# Patient Record
Sex: MALE | Race: WHITE | Employment: OTHER | URBAN - METROPOLITAN AREA
[De-identification: names, ages, dates, MRNs, and addresses within clinical notes are randomized per-mention and may not be internally consistent; named-entity substitution may affect disease eponyms.]

---

## 2018-03-27 NOTE — H&P
Date:   [facility]  Name: Sera Willard                 : 1950 Sex: M  Age: 78 yrs  Acct#:  [id number]               CC:  Discussion regarding port placement    HPI: [The wound involving the right medial thigh has improved. Again, the patient has metastatic melanoma. ]    Meds Prior to Visit:  Crestor     Aspirin Low Strength     Colace        Allergies:  NKDA        Wt Prior: 284lb as of 18    Exam:    Lungs are clear to auscultation  Cardiac regular rate and rhythm without murmurs rubs or gallops  Abdomen soft nondistended and nontender  Extremities with a closed wound. Assessment #1: Hx I87.2 Venous insufficiency (chronic) (peripheral)   Care Plan:              Comments       : The patient will undergo placement of a Port-A-Cath.     Assessment #2: Hx C43.71 Malignant melanoma of right lower limb, including hip   Care Plan:                    Seen by:

## 2018-03-30 ENCOUNTER — HOSPITAL ENCOUNTER (OUTPATIENT)
Age: 68
Setting detail: OUTPATIENT SURGERY
Discharge: HOME | End: 2018-03-30
Attending: SURGERY | Admitting: SURGERY
Payer: MEDICARE

## 2018-03-30 ENCOUNTER — ANESTHESIA (OUTPATIENT)
Dept: OPERATING ROOM | Age: 68
End: 2018-03-30
Payer: MEDICARE

## 2018-03-30 ENCOUNTER — APPOINTMENT (OUTPATIENT)
Dept: GENERAL RADIOLOGY | Age: 68
End: 2018-03-30
Attending: SURGERY
Payer: MEDICARE

## 2018-03-30 ENCOUNTER — ANESTHESIA EVENT (OUTPATIENT)
Dept: OPERATING ROOM | Age: 68
End: 2018-03-30
Payer: MEDICARE

## 2018-03-30 VITALS — SYSTOLIC BLOOD PRESSURE: 127 MMHG | DIASTOLIC BLOOD PRESSURE: 80 MMHG | OXYGEN SATURATION: 99 %

## 2018-03-30 VITALS
HEIGHT: 70 IN | OXYGEN SATURATION: 97 % | BODY MASS INDEX: 40.09 KG/M2 | DIASTOLIC BLOOD PRESSURE: 94 MMHG | HEART RATE: 87 BPM | WEIGHT: 280 LBS | SYSTOLIC BLOOD PRESSURE: 127 MMHG | TEMPERATURE: 97.8 F | RESPIRATION RATE: 18 BRPM

## 2018-03-30 DIAGNOSIS — C43.9 METASTATIC MELANOMA (HCC): Primary | ICD-10-CM

## 2018-03-30 PROCEDURE — 2580000003 HC RX 258: Performed by: SURGERY

## 2018-03-30 PROCEDURE — 7100000011 HC PHASE II RECOVERY - ADDTL 15 MIN: Performed by: SURGERY

## 2018-03-30 PROCEDURE — C1788 PORT, INDWELLING, IMP: HCPCS | Performed by: SURGERY

## 2018-03-30 PROCEDURE — 3700000001 HC ADD 15 MINUTES (ANESTHESIA): Performed by: SURGERY

## 2018-03-30 PROCEDURE — 3600000012 HC SURGERY LEVEL 2 ADDTL 15MIN: Performed by: SURGERY

## 2018-03-30 PROCEDURE — 3700000000 HC ANESTHESIA ATTENDED CARE: Performed by: SURGERY

## 2018-03-30 PROCEDURE — 2500000003 HC RX 250 WO HCPCS: Performed by: SURGERY

## 2018-03-30 PROCEDURE — 6360000002 HC RX W HCPCS: Performed by: SURGERY

## 2018-03-30 PROCEDURE — 6360000002 HC RX W HCPCS: Performed by: NURSE ANESTHETIST, CERTIFIED REGISTERED

## 2018-03-30 PROCEDURE — 76000 FLUOROSCOPY <1 HR PHYS/QHP: CPT

## 2018-03-30 PROCEDURE — 2580000003 HC RX 258: Performed by: NURSE ANESTHETIST, CERTIFIED REGISTERED

## 2018-03-30 PROCEDURE — 3600000002 HC SURGERY LEVEL 2 BASE: Performed by: SURGERY

## 2018-03-30 PROCEDURE — 71045 X-RAY EXAM CHEST 1 VIEW: CPT

## 2018-03-30 PROCEDURE — 7100000010 HC PHASE II RECOVERY - FIRST 15 MIN: Performed by: SURGERY

## 2018-03-30 DEVICE — PORT SMARTPORT 8FR CT TI W/VLV SHTH: Type: IMPLANTABLE DEVICE | Site: CHEST | Status: FUNCTIONAL

## 2018-03-30 RX ORDER — HYDROCODONE BITARTRATE AND ACETAMINOPHEN 5; 325 MG/1; MG/1
1 TABLET ORAL PRN
Status: DISCONTINUED | OUTPATIENT
Start: 2018-03-30 | End: 2018-03-30 | Stop reason: HOSPADM

## 2018-03-30 RX ORDER — FENTANYL CITRATE 50 UG/ML
INJECTION, SOLUTION INTRAMUSCULAR; INTRAVENOUS PRN
Status: DISCONTINUED | OUTPATIENT
Start: 2018-03-30 | End: 2018-03-30 | Stop reason: SDUPTHER

## 2018-03-30 RX ORDER — LIDOCAINE HYDROCHLORIDE 10 MG/ML
INJECTION, SOLUTION INFILTRATION; PERINEURAL PRN
Status: DISCONTINUED | OUTPATIENT
Start: 2018-03-30 | End: 2018-03-30 | Stop reason: HOSPADM

## 2018-03-30 RX ORDER — PROPOFOL 10 MG/ML
INJECTION, EMULSION INTRAVENOUS CONTINUOUS PRN
Status: DISCONTINUED | OUTPATIENT
Start: 2018-03-30 | End: 2018-03-30 | Stop reason: SDUPTHER

## 2018-03-30 RX ORDER — SODIUM CHLORIDE 9 MG/ML
INJECTION, SOLUTION INTRAVENOUS CONTINUOUS PRN
Status: DISCONTINUED | OUTPATIENT
Start: 2018-03-30 | End: 2018-03-30 | Stop reason: SDUPTHER

## 2018-03-30 RX ORDER — SODIUM CHLORIDE 0.9 % (FLUSH) 0.9 %
10 SYRINGE (ML) INJECTION PRN
Status: DISCONTINUED | OUTPATIENT
Start: 2018-03-30 | End: 2018-03-30 | Stop reason: HOSPADM

## 2018-03-30 RX ORDER — SODIUM CHLORIDE 0.9 % (FLUSH) 0.9 %
10 SYRINGE (ML) INJECTION EVERY 12 HOURS SCHEDULED
Status: DISCONTINUED | OUTPATIENT
Start: 2018-03-30 | End: 2018-03-30 | Stop reason: HOSPADM

## 2018-03-30 RX ORDER — ONDANSETRON 2 MG/ML
INJECTION INTRAMUSCULAR; INTRAVENOUS PRN
Status: DISCONTINUED | OUTPATIENT
Start: 2018-03-30 | End: 2018-03-30 | Stop reason: SDUPTHER

## 2018-03-30 RX ORDER — PROPOFOL 10 MG/ML
INJECTION, EMULSION INTRAVENOUS PRN
Status: DISCONTINUED | OUTPATIENT
Start: 2018-03-30 | End: 2018-03-30 | Stop reason: SDUPTHER

## 2018-03-30 RX ORDER — MIDAZOLAM HYDROCHLORIDE 1 MG/ML
INJECTION INTRAMUSCULAR; INTRAVENOUS PRN
Status: DISCONTINUED | OUTPATIENT
Start: 2018-03-30 | End: 2018-03-30 | Stop reason: SDUPTHER

## 2018-03-30 RX ORDER — HEPARIN SODIUM (PORCINE) LOCK FLUSH IV SOLN 100 UNIT/ML 100 UNIT/ML
SOLUTION INTRAVENOUS PRN
Status: DISCONTINUED | OUTPATIENT
Start: 2018-03-30 | End: 2018-03-30 | Stop reason: HOSPADM

## 2018-03-30 RX ORDER — OXYCODONE HYDROCHLORIDE AND ACETAMINOPHEN 5; 325 MG/1; MG/1
1 TABLET ORAL EVERY 6 HOURS PRN
Qty: 15 TABLET | Refills: 0 | Status: SHIPPED | OUTPATIENT
Start: 2018-03-30 | End: 2018-04-06

## 2018-03-30 RX ADMIN — PROPOFOL 120 MG: 10 INJECTION, EMULSION INTRAVENOUS at 07:46

## 2018-03-30 RX ADMIN — SODIUM CHLORIDE: 9 INJECTION, SOLUTION INTRAVENOUS at 07:44

## 2018-03-30 RX ADMIN — MIDAZOLAM HYDROCHLORIDE 2 MG: 1 INJECTION, SOLUTION INTRAMUSCULAR; INTRAVENOUS at 07:44

## 2018-03-30 RX ADMIN — FENTANYL CITRATE 100 MCG: 50 INJECTION, SOLUTION INTRAMUSCULAR; INTRAVENOUS at 07:46

## 2018-03-30 RX ADMIN — SODIUM CHLORIDE: 9 INJECTION, SOLUTION INTRAVENOUS at 08:20

## 2018-03-30 RX ADMIN — ONDANSETRON 4 MG: 2 INJECTION, SOLUTION INTRAMUSCULAR; INTRAVENOUS at 07:46

## 2018-03-30 RX ADMIN — PROPOFOL 70 MCG/KG/MIN: 10 INJECTION, EMULSION INTRAVENOUS at 07:46

## 2018-03-30 RX ADMIN — CEFAZOLIN SODIUM 3 G: 10 INJECTION, POWDER, FOR SOLUTION INTRAVENOUS at 08:04

## 2018-03-30 ASSESSMENT — PAIN DESCRIPTION - LOCATION
LOCATION: CHEST

## 2018-03-30 ASSESSMENT — PAIN SCALES - GENERAL
PAINLEVEL_OUTOF10: 0
PAINLEVEL_OUTOF10: 2
PAINLEVEL_OUTOF10: 0
PAINLEVEL_OUTOF10: 0

## 2018-03-30 ASSESSMENT — PAIN DESCRIPTION - FREQUENCY: FREQUENCY: CONTINUOUS

## 2018-03-30 ASSESSMENT — PAIN DESCRIPTION - PAIN TYPE
TYPE: SURGICAL PAIN

## 2018-03-30 ASSESSMENT — PULMONARY FUNCTION TESTS
PIF_VALUE: 0
PIF_VALUE: 1
PIF_VALUE: 0
PIF_VALUE: 1
PIF_VALUE: 1
PIF_VALUE: 0

## 2018-03-30 ASSESSMENT — PAIN DESCRIPTION - DESCRIPTORS: DESCRIPTORS: DISCOMFORT

## 2018-03-30 ASSESSMENT — PAIN DESCRIPTION - ONSET: ONSET: GRADUAL

## 2018-03-30 ASSESSMENT — PAIN - FUNCTIONAL ASSESSMENT: PAIN_FUNCTIONAL_ASSESSMENT: 0-10

## 2018-03-30 ASSESSMENT — LIFESTYLE VARIABLES: SMOKING_STATUS: 0

## 2018-03-30 ASSESSMENT — PAIN DESCRIPTION - ORIENTATION
ORIENTATION: LEFT;UPPER
ORIENTATION: LEFT

## 2018-03-30 NOTE — ANESTHESIA PRE PROCEDURE
Department of Anesthesiology  Preprocedure Note       Name:  Collin Hennessy   Age:  78 y.o.  :  1950                                          MRN:  [id number]         Date:  3/30/2018      Surgeon: Enrrique Aranda):  Raffaele Leonard MD    Procedure: Procedure(s):  POWER PORT INSERTION (THIS START TIME)    Medications prior to admission:   Prior to Admission medications    Medication Sig Start Date End Date Taking? Authorizing Provider   docusate sodium (COLACE) 100 MG capsule Take 100 mg by mouth daily    Yes Historical Provider, MD   aspirin 325 MG tablet Take 325 mg by mouth daily Prescribed per cardiology , stent; pt states instructed to hold 7 days preop per Dr Dario Villatoro. Yes Historical Provider, MD   rosuvastatin (CRESTOR) 40 MG tablet Take 40 mg by mouth every evening. Yes Historical Provider, MD       Current medications:    Current Facility-Administered Medications   Medication Dose Route Frequency Provider Last Rate Last Dose    sodium chloride flush 0.9 % injection 10 mL  10 mL Intravenous 2 times per day Raffaele Leonard MD        sodium chloride flush 0.9 % injection 10 mL  10 mL Intravenous PRN Raffaele Leonard MD        ceFAZolin (ANCEF) 2 g in sterile water 20 mL IV syringe  2 g Intravenous On Call to [place] [address] Anthony Busby MD        ceFAZolin (ANCEF) 2-3 GM-% IVPB (duplex) SOLR                Allergies:  No Known Allergies    Problem List:    Patient Active Problem List   Diagnosis Code    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 4XXA       Past Medical History:        Diagnosis Date    A-fib [facility])     CAD (coronary artery disease)     MI, STENTS. follows with Dr Xavier Van [facility]) 2014    CA COLON    Encounter for aspirin therapy     POST ANGIOPLASTY WITH STENTS    Hyperlipidemia     Melanoma of thigh, right ([facility] [address] 2018    Neuropathy ([facility] [address]     LEFT ULNAR NERVE/right also    Preoperative clearance 14    Cardiac 8 03/21/2015    CREATININE 0.9 03/21/2015    GFRAA >60 03/21/2015    LABGLOM >60 03/21/2015    GLUCOSE 128 03/21/2015    PROT 7.3 03/16/2015    CALCIUM 8.4 03/21/2015    BILITOT 0.5 03/16/2015    ALKPHOS 187 03/16/2015    AST 57 03/16/2015    ALT 52 03/16/2015       POC Tests: No results for input(s): POCGLU, POCNA, POCK, POCCL, POCBUN, POCHEMO, POCHCT in the last 72 hours. Coags:   Lab Results   Component Value Date    PROTIME 12.8 03/20/2015    INR 1.2 03/20/2015    APTT 36.0 03/20/2015       HCG (If Applicable): No results found for: PREGTESTUR, PREGSERUM, HCG, HCGQUANT     ABGs: No results found for: PHART, PO2ART, FJI5HPL, OWU5HRG, BEART, W2BICIRK     Type & Screen (If Applicable):  No results found for: LABABO, [address]    Anesthesia Evaluation  Patient summary reviewed and Nursing notes reviewed no history of anesthetic complications:   Airway: Mallampati: II  TM distance: >3 FB   Neck ROM: full  Mouth opening: > = 3 FB Dental: normal exam         Pulmonary:Negative Pulmonary ROS breath sounds clear to auscultation      (-) not a current smoker                           Cardiovascular:  Exercise tolerance: good (>4 METS),   (+) CAD:, CABG/stent:, dysrhythmias: atrial fibrillation, hyperlipidemia      ECG reviewed  Rhythm: regular  Rate: normal      Cleared by cardiology              Neuro/Psych:   (+) neuromuscular disease:,              ROS comment: neuropathy GI/Hepatic/Renal:             Endo/Other:    (+) malignancy/cancer. ROS comment: melanoma Abdominal:           Vascular: negative vascular ROS. Anesthesia Plan      MAC     ASA 3             Anesthetic plan and risks discussed with patient and spouse.                     Patrici Meckel, MD   3/30/2018

## 2018-03-30 NOTE — ANESTHESIA POSTPROCEDURE EVALUATION
Department of Anesthesiology  Postprocedure Note    Patient: Antonio Shoemaker  MRN: [id number]  YOB: 1950  Date of evaluation: 3/30/2018  Time:  5:39 PM     Procedure Summary     Date:  03/30/18 Room / Location:  [facility] OR 07 / [facility] OR    Anesthesia Start:  2849 Anesthesia Stop:  0827    Procedure:  POWER PORT INSERTION (THIS START TIME) (N/A Chest) Diagnosis:  [facility])    Surgeon:  Annalise Bo MD Responsible Provider:  Da Sweeney MD    Anesthesia Type:  MAC ASA Status:  3          Anesthesia Type: MAC    Abigail Phase I: Abigail Score: 10    Abigail Phase II: Abigail Score: 10    Last vitals: Reviewed and per EMR flowsheets.        Anesthesia Post Evaluation    Patient location during evaluation: PACU  Patient participation: complete - patient participated  Level of consciousness: awake and alert  Airway patency: patent  Nausea & Vomiting: no nausea and no vomiting  Complications: no  Cardiovascular status: hemodynamically stable  Respiratory status: acceptable  Hydration status: euvolemic

## 2018-03-30 NOTE — PROGRESS NOTES
0831 admitted to stage 2 pacu , family here at bedside and pt is taking po well   0920 post op chest x-ray being done
4225 discharge instructions reviewed with pt and his wife and they both verbalized understanding of instructions
registration    [x] You can expect a call the business day prior to procedure to notify you of your arrival time    [x] If you receive a survey after surgery we would greatly appreciate your comments    [] Parent/guardian of a minor must accompany their child and remain on the premises  the entire time they are under our care     [] Pediatric patients may bring favorite toy, blanket or comfort item with them    [] A caregiver or family member must remain with the patient during their stay if they are mentally handicapped, have dementia, disoriented or unable to use a call light or would be a safety concern if left unattended    [x] Please notify surgeon if you develop any illness between now and time of surgery (cold, cough, sore throat, fever, nausea, vomiting) or any signs of infections  including skin, wounds, and dental.    [] Other instructions    EDUCATIONAL MATERIALS PROVIDED:    [] PAT Preoperative Education Packet/Booklet     [] Medication List    [] Fluoroscopy Information Pamphlet    [] Transfusion bracelet applied with instructions    [] Joint replacement video reviewed    [] Shower with soap, lather and rinse well, and use CHG wipes provided the evening before surgery as instructed

## 2018-03-30 NOTE — ANESTHESIA PROCEDURE NOTES
Peripheral Block    Peripheral Block  Local infiltration: lidocaine, ropivacaine and decadron  Local infiltration: lidocaine, ropivacaine and decadron

## 2018-04-27 ENCOUNTER — HOSPITAL ENCOUNTER (OUTPATIENT)
Dept: GENERAL RADIOLOGY | Age: 68
Discharge: HOME | End: 2018-04-29
Payer: MEDICARE

## 2018-04-27 ENCOUNTER — HOSPITAL ENCOUNTER (OUTPATIENT)
Age: 68
Discharge: HOME | End: 2018-04-29
Payer: MEDICARE

## 2018-04-27 DIAGNOSIS — R06.02 SOB (SHORTNESS OF BREATH): ICD-10-CM

## 2018-04-27 PROCEDURE — 71046 X-RAY EXAM CHEST 2 VIEWS: CPT

## 2018-10-01 ENCOUNTER — HOSPITAL ENCOUNTER (OUTPATIENT)
Dept: GENERAL RADIOLOGY | Age: 68
Discharge: HOME | End: 2018-10-03
Payer: MEDICARE

## 2018-10-01 ENCOUNTER — HOSPITAL ENCOUNTER (OUTPATIENT)
Age: 68
Discharge: HOME | End: 2018-10-03
Payer: MEDICARE

## 2018-10-01 DIAGNOSIS — Z01.818 PREOP EXAMINATION: ICD-10-CM

## 2018-10-01 LAB
ALBUMIN SERPL-MCNC: 4.1 G/DL (ref 3.5–5.2)
ALP BLD-CCNC: 112 U/L (ref 40–129)
ALT SERPL-CCNC: 17 U/L (ref 0–40)
ANION GAP SERPL CALCULATED.3IONS-SCNC: 20 MMOL/L (ref 7–16)
AST SERPL-CCNC: 27 U/L (ref 0–39)
BASOPHILS ABSOLUTE: 0.13 E9/L (ref 0–0.2)
BASOPHILS RELATIVE PERCENT: 1.7 % (ref 0–2)
BILIRUB SERPL-MCNC: 0.5 MG/DL (ref 0–1.2)
BUN BLDV-MCNC: 9 MG/DL (ref 8–23)
CALCIUM SERPL-MCNC: 9 MG/DL (ref 8.6–10.2)
CHLORIDE BLD-SCNC: 101 MMOL/L (ref 98–107)
CO2: 20 MMOL/L (ref 22–29)
CREAT SERPL-MCNC: 1.1 MG/DL (ref 0.7–1.2)
EOSINOPHILS ABSOLUTE: 0.85 E9/L (ref 0.05–0.5)
EOSINOPHILS RELATIVE PERCENT: 11.4 % (ref 0–6)
GFR AFRICAN AMERICAN: >60
GFR NON-AFRICAN AMERICAN: >60 ML/MIN/1.73
GLUCOSE BLD-MCNC: 78 MG/DL (ref 74–109)
HCT VFR BLD CALC: 45.2 % (ref 37–54)
HEMOGLOBIN: 14.5 G/DL (ref 12.5–16.5)
IMMATURE GRANULOCYTES #: 0.02 E9/L
IMMATURE GRANULOCYTES %: 0.3 % (ref 0–5)
LYMPHOCYTES ABSOLUTE: 2.22 E9/L (ref 1.5–4)
LYMPHOCYTES RELATIVE PERCENT: 29.7 % (ref 20–42)
MCH RBC QN AUTO: 29.1 PG (ref 26–35)
MCHC RBC AUTO-ENTMCNC: 32.1 % (ref 32–34.5)
MCV RBC AUTO: 90.6 FL (ref 80–99.9)
MONOCYTES ABSOLUTE: 0.48 E9/L (ref 0.1–0.95)
MONOCYTES RELATIVE PERCENT: 6.4 % (ref 2–12)
NEUTROPHILS ABSOLUTE: 3.77 E9/L (ref 1.8–7.3)
NEUTROPHILS RELATIVE PERCENT: 50.5 % (ref 43–80)
PDW BLD-RTO: 13.2 FL (ref 11.5–15)
PLATELET # BLD: 286 E9/L (ref 130–450)
PMV BLD AUTO: 9.6 FL (ref 7–12)
POTASSIUM SERPL-SCNC: 4.2 MMOL/L (ref 3.5–5)
RBC # BLD: 4.99 E12/L (ref 3.8–5.8)
SODIUM BLD-SCNC: 141 MMOL/L (ref 132–146)
TOTAL PROTEIN: 6.9 G/DL (ref 6.4–8.3)
WBC # BLD: 7.5 E9/L (ref 4.5–11.5)

## 2018-10-01 PROCEDURE — 85025 COMPLETE CBC W/AUTO DIFF WBC: CPT

## 2018-10-01 PROCEDURE — 80053 COMPREHEN METABOLIC PANEL: CPT

## 2018-10-01 PROCEDURE — 71046 X-RAY EXAM CHEST 2 VIEWS: CPT

## 2018-11-29 ENCOUNTER — HOSPITAL ENCOUNTER (OUTPATIENT)
Dept: OCCUPATIONAL THERAPY | Age: 68
Setting detail: THERAPIES SERIES
Discharge: HOME | End: 2018-11-29
Payer: MEDICARE

## 2018-11-29 PROCEDURE — G8988 SELF CARE GOAL STATUS: HCPCS | Performed by: OCCUPATIONAL THERAPIST

## 2018-11-29 PROCEDURE — 97165 OT EVAL LOW COMPLEX 30 MIN: CPT | Performed by: OCCUPATIONAL THERAPIST

## 2018-11-29 PROCEDURE — G8987 SELF CARE CURRENT STATUS: HCPCS | Performed by: OCCUPATIONAL THERAPIST

## 2018-11-29 NOTE — PROGRESS NOTES
mesh,implant bio mesh    LEG SURGERY Right       CA excision     MALIGNANT SKIN LESION EXCISION   03/12/2018     melanoma    OTHER SURGICAL HISTORY Left 5/23/2014     left ulnar nerve decompression    OTHER SURGICAL HISTORY Left 03/30/2018     port insertion    [facility] TUNNELED CTR VAD W/SUBQ PORT AGE 5 YR/> N/A 3/30/2018     POWER PORT INSERTION (.THIS START TIME) performed by Savanna Kuo MD at [facility] TUNNELED VENOUS PORT PLACEMENT                   [x]Surgery:    [x]Lymph node removal:   []Radiation Therapy:   [x]Chemotherapy:   []History of Cellulitis/Infection:   [x]Family history of lymphedema: father with ankle swelling  []Previous treatment for lymphedema:   []Current compression garment use:     Home Living: patient lives with spouse in one floor home with ramp entry and occasional use of single point cane. Patient has shower stall with shower seat and grab bars. Patient does not work at this time. Prior Level of Function: independent all aspects. Spouse assist as needed    IADL History  Homemaking Responsibility: spouse performs with patient assistance as needed  Shopping Responsibility: spouse performs with patient assistance as needed  Mode of Transportation: car  Leisure & Hobbies: none noted at time of evaluation  Work: patient does not work at this time. Pain Level: none at time of evaluation  Pitting Edema: none at time of evaluation  Fibrotic tissue:  Patient with hardened area at distal end of incision site.   Skin Integrity: fair      Lymphedema Lower Extremity Range of Motion    Measurements are made in 4cm increments and are circumferential.           Left - Evaluation 29Nov. 2018 Right - Evaluation 29Nov. 2018   toes 29.25 28   4cm 28 27.25   8cm 29 29   12cm 36.25 36.75   16cm 28.25 29.25   20cm 25.25 28.25   24cm 27.75 31   28cm 31.5 35   32cm 36 39.75   36cm 40.75 44   40cm 43.75 45   44cm 43.5 45.5   48cm 42.75 44   52cm 43.75 46   56cm 48 50.75   60cm 51 55

## 2018-12-03 ENCOUNTER — HOSPITAL ENCOUNTER (OUTPATIENT)
Dept: OCCUPATIONAL THERAPY | Age: 68
Setting detail: THERAPIES SERIES
Discharge: HOME | End: 2018-12-03
Payer: MEDICARE

## 2018-12-03 PROCEDURE — 97530 THERAPEUTIC ACTIVITIES: CPT | Performed by: OCCUPATIONAL THERAPIST

## 2018-12-03 NOTE — PROGRESS NOTES
Bandaging/Family Assist  [x]MLD  [x]Self Massage  [x]Exercise  [x]Education  []Obtain referral for garments  []Medical Hold  [facility]

## 2018-12-06 ENCOUNTER — HOSPITAL ENCOUNTER (OUTPATIENT)
Dept: OCCUPATIONAL THERAPY | Age: 68
Setting detail: THERAPIES SERIES
Discharge: HOME | End: 2018-12-06
Payer: MEDICARE

## 2018-12-06 PROCEDURE — 97530 THERAPEUTIC ACTIVITIES: CPT | Performed by: OCCUPATIONAL THERAPIST

## 2018-12-06 NOTE — PROGRESS NOTES
Fair  [] Poor  Patient is programming at home:  [x] Yes  [] No  Family is assisting with programming: [x] Yes  [] No  Home programming is consistent: [x] Yes  [] No  Other:   Response to treatment:  good  Instructions for patient:   [x] Verbal [] Written  Instructions addressed:  Manual lymphatic drainage message sequence    Patient Goal: to be able to lift right leg up.     STG: 3 weeks  1. Patient will demonstrate knowledge and understanding for lymphedema precautions, skin care and self management to decrease progression of lymphedema and risk of infection. Patient education cotinued on lymphedema precautions, skin care / self management. Patient and spouse able to verbalize understanding. Progressing toward goal.    2.  Patient will present with decreased limb volume in the involved extremity from moderate to minimal for improved functional mobility. Patient presented to treatment with increased edema throughout right lower extremity. Therapist continued education with patient regarding complete decongestive therapy and manual lymphatic drainage sequence for lower extremity. Therapist performed mld sequence explaining each step as they were performed. Patient able to verbalize understanding. Progressing toward goal.     3.  Patient will demonstrate compliance with compression therapy for independent management of lymphedema.        LT weeks  1. Patient will be independent with self management of lymphedema including understanding garment wear schedule, self compression bandaging, HEP and self care. 2.  Patient will be fitted for appropriate compression garments for long term management of lymphedema.   3.  Patient will present with decreased limb volume in the involved extremity from mild to trace  for improved functional mobilitys    Plan: Continue to address:  [x]Bandaging  [x] Self Bandaging/Family Assist  [x]MLD  [x]Self Massage  [x]Exercise  [x]Education  []Obtain referral for garments []Medical Hold  []Discharge to [facility] OTR/L, CLT

## 2018-12-10 ENCOUNTER — HOSPITAL ENCOUNTER (OUTPATIENT)
Dept: OCCUPATIONAL THERAPY | Age: 68
Setting detail: THERAPIES SERIES
Discharge: HOME | End: 2018-12-10
Payer: MEDICARE

## 2018-12-10 PROCEDURE — 97530 THERAPEUTIC ACTIVITIES: CPT | Performed by: OCCUPATIONAL THERAPIST

## 2018-12-13 ENCOUNTER — HOSPITAL ENCOUNTER (OUTPATIENT)
Dept: OCCUPATIONAL THERAPY | Age: 68
Setting detail: THERAPIES SERIES
Discharge: HOME | End: 2018-12-13
Payer: MEDICARE

## 2018-12-18 ENCOUNTER — HOSPITAL ENCOUNTER (OUTPATIENT)
Dept: OCCUPATIONAL THERAPY | Age: 68
Setting detail: THERAPIES SERIES
Discharge: HOME | End: 2018-12-18
Payer: MEDICARE

## 2018-12-18 PROCEDURE — 97530 THERAPEUTIC ACTIVITIES: CPT | Performed by: OCCUPATIONAL THERAPIST

## 2018-12-18 NOTE — PROGRESS NOTES
from toes to knees. Patient tolerated bandaging process well. Patient and spouse educated on signs and symptoms that would require bandages to be removed. Patient and spouse verbalized understanding. Patient progressing toward goal.          LT weeks  1. Patient will be independent with self management of lymphedema including understanding garment wear schedule, self compression bandaging, HEP and self care. 2.  Patient will be fitted for appropriate compression garments for long term management of lymphedema.   3.  Patient will present with decreased limb volume in the involved extremity from mild to trace  for improved functional mobilitys    Plan: Continue to address:  [x]Bandaging  [x] Self Bandaging/Family Assist  [x]MLD  [x]Self Massage  [x]Exercise  [x]Education  []Obtain referral for garments  []Medical Hold  []Discharge to [facility] OTR/L, CLT

## 2018-12-20 ENCOUNTER — HOSPITAL ENCOUNTER (OUTPATIENT)
Dept: OCCUPATIONAL THERAPY | Age: 68
Setting detail: THERAPIES SERIES
Discharge: HOME | End: 2018-12-20
Payer: MEDICARE

## 2018-12-20 PROCEDURE — 97530 THERAPEUTIC ACTIVITIES: CPT | Performed by: OCCUPATIONAL THERAPIST

## 2018-12-20 NOTE — PROGRESS NOTES
stated increased pain and irritation in area at end of incision site with increased edema when in compression. Patient stated area of sensitivity has increased around area of hardness. Area of sensitivity about nine centimeters in diameter. Increased itch and rubbing bilateral sides of knee healing. Objective:  [] Measurement [x]Manual Lymph Drainage  [x]Bandaging   []Kinesiotaping [x] Education    []Self Massage   []Self Bandaging [] Exercise    []Wound Care  []Hygiene  [] Other      Assessment:  Knowledge of home program:   [] Good [x] Fair  [] Poor  Patient is programming at home:  [x] Yes  [] No  Family is assisting with programming: [x] Yes  [] No  Home programming is consistent: [x] Yes  [] No  Other:   Response to treatment:  good  Instructions for patient:   [x] Verbal [] Written  Instructions addressed:  Short stretch bandaging, wear schedule    DIAGNOSIS ASSESSMENT:   LYMPHEDEMA IS CAUSED BY:   Lymphedema, not elsewhere classified [I89.0] (includes CVI-related lymphedema):     No:  Malignant Melanoma - c43.71; 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 Later in Stage II, the limb may not pit as excess subcutaneous fat and fibrosis develop.      SYMPTOMS:   PATIENT EXHIBITS THE FOLLOWING SYMPTOMS DESPITE CONSERVATIVE THERAPY (check all that apply):      Fibrosis    Progressive edema   Unable to control swelling    Impaired mobility   Pain          CONSERVATIVE TREATMENT:   PATIENT HAS TRIED CONSERVATIVE TREATMENTS (COMPRESSION/EXERCISE/ELEVATION):   Yes  Patient is currently utilizing short stretch

## 2018-12-24 ENCOUNTER — HOSPITAL ENCOUNTER (OUTPATIENT)
Dept: OCCUPATIONAL THERAPY | Age: 68
Setting detail: THERAPIES SERIES
Discharge: HOME | End: 2018-12-24
Payer: MEDICARE

## 2018-12-24 PROCEDURE — 97530 THERAPEUTIC ACTIVITIES: CPT | Performed by: OCCUPATIONAL THERAPIST

## 2018-12-28 NOTE — PROGRESS NOTES
Patient has been performing treatment for three weeks. If YES, select type of conservative treatment and duration of use (check all that apply): Instructed on self-manual lymphatic drainage techniques (self-MLD): Yes    Instructed on appropriate skin/nail care practices: Yes     Bandaging: <4 weeks    Elevation: <4 weeks   Exercise: <4 weeks       PNEUMATIC COMPRESSION DEVICE EVALUATION:   Patient has tried an  basic pneumatic compression device? No   If NO, does patient have a need for an  basic pneumatic compression device despite conservative treatment (conservative treatment includes: MLD, appropriate skin/nail care practices, compression garments, bandaging, elevation and exercise)? Yes        TREATMENT PLAN:   Patient to complete the following treatment(s):   Professional Lymphedema Treatment: 2-3x/wk for six weeks   Self-MLD: daily   Exercise: 4-5x/wk   Elevation: daily as able            Patient Goal: to be able to lift right leg up.     STG: 3 weeks  1. Patient will demonstrate knowledge and understanding for lymphedema precautions, skin care and self management to decrease progression of lymphedema and risk of infection. Patient education cotinued on lymphedema precautions, skin care / self management. Patient able to verbalize understanding. Progressing toward goal.    2.  Patient will present with decreased limb volume in the involved extremity from moderate to minimal for improved functional mobility. Patient presented to treatment with edema throughout right lower extremity. Patient stated he has been performing mld message up to bandaging since last treatment session. Therapist continued education with patient regarding complete decongestive therapy and manual lymphatic drainage sequence for lower extremity. Patient with increased sensitivity to area at end of incision site with increased edema. Area also with increased firmness.  Therapist performed mld sequence for lower extremity involvement. Therapist had increased attention to area at end of incision site. Patient with noted improvement of sensitivity and pain in area following message sequence. Progressing toward goal.     3.  Patient will demonstrate compliance with compression therapy for independent management of lymphedema. Therapist continued short stretch bandaging education of lower extremity. Patient stated tenderness at ankle and rubbing area at knee has improved. Therapist adjusted foam pieces of bandaging and also added additional compression at incision site to tolerance of patient. Patient tolerated bandaging process well. Patient educated if bandaging again causes increased pain to remove. Patient will keep bandaging on for two days. Therapist will monitor. Patient and spouse educated on signs and symptoms that would require bandages to be removed. Patient and spouse verbalized understanding. Patient progressing toward goal.          LT weeks  1. Patient will be independent with self management of lymphedema including understanding garment wear schedule, self compression bandaging, HEP and self care. 2.  Patient will be fitted for appropriate compression garments for long term management of lymphedema.   3.  Patient will present with decreased limb volume in the involved extremity from mild to trace  for improved functional mobilitys    Plan: Continue to address:  [x]Bandaging  [x] Self Bandaging/Family Assist  [x]MLD  [x]Self Massage  [x]Exercise  [x]Education  []Obtain referral for garments  []Medical Hold  []Discharge to [facility] OTR/L, CLT

## 2019-01-01 ENCOUNTER — HOSPITAL ENCOUNTER (OUTPATIENT)
Dept: OCCUPATIONAL THERAPY | Age: 69
Setting detail: THERAPIES SERIES
Discharge: HOME | End: 2019-01-31
Payer: MEDICARE

## 2019-01-01 ENCOUNTER — HOSPITAL ENCOUNTER (EMERGENCY)
Age: 69
Discharge: HOME | End: 2019-12-27
Attending: EMERGENCY MEDICINE
Payer: MEDICARE

## 2019-01-01 ENCOUNTER — APPOINTMENT (OUTPATIENT)
Dept: OCCUPATIONAL THERAPY | Age: 69
End: 2019-01-01
Payer: MEDICARE

## 2019-01-01 ENCOUNTER — ANESTHESIA (OUTPATIENT)
Dept: OPERATING ROOM | Age: 69
DRG: 166 | End: 2019-01-01
Payer: MEDICARE

## 2019-01-01 ENCOUNTER — HOSPITAL ENCOUNTER (OUTPATIENT)
Age: 69
Discharge: HOME | End: 2019-03-28
Payer: MEDICARE

## 2019-01-01 ENCOUNTER — HOSPITAL ENCOUNTER (OUTPATIENT)
Dept: OCCUPATIONAL THERAPY | Age: 69
Setting detail: THERAPIES SERIES
Discharge: HOME | End: 2019-01-17
Payer: MEDICARE

## 2019-01-01 ENCOUNTER — APPOINTMENT (OUTPATIENT)
Dept: GENERAL RADIOLOGY | Age: 69
DRG: 166 | End: 2019-01-01
Attending: FAMILY MEDICINE
Payer: MEDICARE

## 2019-01-01 ENCOUNTER — HOSPITAL ENCOUNTER (OUTPATIENT)
Dept: OCCUPATIONAL THERAPY | Age: 69
Setting detail: THERAPIES SERIES
Discharge: HOME | End: 2019-01-15
Payer: MEDICARE

## 2019-01-01 ENCOUNTER — HOSPITAL ENCOUNTER (OUTPATIENT)
Dept: OCCUPATIONAL THERAPY | Age: 69
Setting detail: THERAPIES SERIES
Discharge: HOME | End: 2019-02-05
Payer: MEDICARE

## 2019-01-01 ENCOUNTER — ANESTHESIA EVENT (OUTPATIENT)
Dept: OPERATING ROOM | Age: 69
DRG: 166 | End: 2019-01-01
Payer: MEDICARE

## 2019-01-01 ENCOUNTER — HOSPITAL ENCOUNTER (OUTPATIENT)
Dept: ULTRASOUND IMAGING | Age: 69
Discharge: HOME | End: 2019-03-30
Payer: MEDICARE

## 2019-01-01 ENCOUNTER — HOSPITAL ENCOUNTER (OUTPATIENT)
Dept: OCCUPATIONAL THERAPY | Age: 69
Setting detail: THERAPIES SERIES
Discharge: HOME | End: 2019-01-10
Payer: MEDICARE

## 2019-01-01 ENCOUNTER — HOSPITAL ENCOUNTER (OUTPATIENT)
Age: 69
Discharge: HOME | End: 2019-12-27
Payer: MEDICARE

## 2019-01-01 ENCOUNTER — HOSPITAL ENCOUNTER (OUTPATIENT)
Dept: OCCUPATIONAL THERAPY | Age: 69
Setting detail: THERAPIES SERIES
Discharge: HOME | End: 2019-02-11
Payer: MEDICARE

## 2019-01-01 ENCOUNTER — HOSPITAL ENCOUNTER (OUTPATIENT)
Dept: OCCUPATIONAL THERAPY | Age: 69
Setting detail: THERAPIES SERIES
Discharge: HOME | End: 2019-03-07
Payer: MEDICARE

## 2019-01-01 ENCOUNTER — HOSPITAL ENCOUNTER (OUTPATIENT)
Dept: OCCUPATIONAL THERAPY | Age: 69
Setting detail: THERAPIES SERIES
Discharge: HOME | End: 2019-02-14
Payer: MEDICARE

## 2019-01-01 ENCOUNTER — HOSPITAL ENCOUNTER (OUTPATIENT)
Dept: OCCUPATIONAL THERAPY | Age: 69
Setting detail: THERAPIES SERIES
Discharge: HOME | End: 2019-03-05
Payer: MEDICARE

## 2019-01-01 ENCOUNTER — APPOINTMENT (OUTPATIENT)
Dept: ULTRASOUND IMAGING | Age: 69
DRG: 166 | End: 2019-01-01
Attending: FAMILY MEDICINE
Payer: MEDICARE

## 2019-01-01 ENCOUNTER — HOSPITAL ENCOUNTER (OUTPATIENT)
Age: 69
Discharge: HOME | End: 2019-09-19
Payer: MEDICARE

## 2019-01-01 ENCOUNTER — HOSPITAL ENCOUNTER (OUTPATIENT)
Dept: OCCUPATIONAL THERAPY | Age: 69
Setting detail: THERAPIES SERIES
Discharge: HOME | End: 2019-01-28
Payer: MEDICARE

## 2019-01-01 ENCOUNTER — APPOINTMENT (OUTPATIENT)
Dept: CT IMAGING | Age: 69
End: 2019-01-01
Payer: MEDICARE

## 2019-01-01 ENCOUNTER — HOSPITAL ENCOUNTER (OUTPATIENT)
Dept: OCCUPATIONAL THERAPY | Age: 69
Setting detail: THERAPIES SERIES
Discharge: HOME | End: 2019-02-07
Payer: MEDICARE

## 2019-01-01 ENCOUNTER — HOSPITAL ENCOUNTER (OUTPATIENT)
Dept: OCCUPATIONAL THERAPY | Age: 69
Setting detail: THERAPIES SERIES
Discharge: HOME | End: 2019-02-21
Payer: MEDICARE

## 2019-01-01 ENCOUNTER — HOSPITAL ENCOUNTER (OUTPATIENT)
Dept: OCCUPATIONAL THERAPY | Age: 69
Setting detail: THERAPIES SERIES
Discharge: HOME | End: 2019-01-24
Payer: MEDICARE

## 2019-01-01 ENCOUNTER — HOSPITAL ENCOUNTER (OUTPATIENT)
Dept: OCCUPATIONAL THERAPY | Age: 69
Setting detail: THERAPIES SERIES
Discharge: HOME | End: 2019-05-07
Payer: MEDICARE

## 2019-01-01 ENCOUNTER — HOSPITAL ENCOUNTER (OUTPATIENT)
Dept: OCCUPATIONAL THERAPY | Age: 69
Setting detail: THERAPIES SERIES
Discharge: HOME | End: 2019-01-22
Payer: MEDICARE

## 2019-01-01 ENCOUNTER — HOSPITAL ENCOUNTER (OUTPATIENT)
Dept: OCCUPATIONAL THERAPY | Age: 69
Setting detail: THERAPIES SERIES
Discharge: HOME | End: 2019-01-04
Payer: MEDICARE

## 2019-01-01 ENCOUNTER — HOSPITAL ENCOUNTER (INPATIENT)
Age: 69
LOS: 7 days | Discharge: NURSING, REHAB OR LONG-TERM CARE | DRG: 166 | End: 2020-01-03
Attending: FAMILY MEDICINE | Admitting: FAMILY MEDICINE
Payer: MEDICARE

## 2019-01-01 ENCOUNTER — HOSPITAL ENCOUNTER (OUTPATIENT)
Dept: ULTRASOUND IMAGING | Age: 69
Discharge: HOME | End: 2019-03-14
Payer: MEDICARE

## 2019-01-01 ENCOUNTER — HOSPITAL ENCOUNTER (OUTPATIENT)
Age: 69
Discharge: HOME | End: 2019-08-16
Payer: MEDICARE

## 2019-01-01 ENCOUNTER — APPOINTMENT (OUTPATIENT)
Dept: ULTRASOUND IMAGING | Age: 69
End: 2019-01-01
Payer: MEDICARE

## 2019-01-01 ENCOUNTER — HOSPITAL ENCOUNTER (OUTPATIENT)
Dept: OCCUPATIONAL THERAPY | Age: 69
Setting detail: THERAPIES SERIES
Discharge: HOME | End: 2019-02-26
Payer: MEDICARE

## 2019-01-01 VITALS
HEART RATE: 99 BPM | OXYGEN SATURATION: 97 % | RESPIRATION RATE: 18 BRPM | BODY MASS INDEX: 41.52 KG/M2 | DIASTOLIC BLOOD PRESSURE: 107 MMHG | WEIGHT: 290 LBS | SYSTOLIC BLOOD PRESSURE: 154 MMHG | HEIGHT: 70 IN

## 2019-01-01 VITALS
WEIGHT: 315 LBS | HEART RATE: 99 BPM | HEIGHT: 70 IN | RESPIRATION RATE: 16 BRPM | BODY MASS INDEX: 45.1 KG/M2 | SYSTOLIC BLOOD PRESSURE: 152 MMHG | OXYGEN SATURATION: 95 % | TEMPERATURE: 98.2 F | DIASTOLIC BLOOD PRESSURE: 118 MMHG

## 2019-01-01 VITALS — OXYGEN SATURATION: 96 % | SYSTOLIC BLOOD PRESSURE: 170 MMHG | DIASTOLIC BLOOD PRESSURE: 116 MMHG | TEMPERATURE: 96.8 F

## 2019-01-01 DIAGNOSIS — M79.89 LEG SWELLING: ICD-10-CM

## 2019-01-01 DIAGNOSIS — Z85.820 HISTORY OF MELANOMA: ICD-10-CM

## 2019-01-01 DIAGNOSIS — Z79.899 ENCOUNTER FOR LONG-TERM (CURRENT) USE OF OTHER MEDICATIONS: ICD-10-CM

## 2019-01-01 DIAGNOSIS — C79.31 BRAIN METASTASES (HCC): ICD-10-CM

## 2019-01-01 DIAGNOSIS — C79.9 METASTATIC CANCER (HCC): ICD-10-CM

## 2019-01-01 DIAGNOSIS — R22.41 MASS OF LEG, RIGHT: ICD-10-CM

## 2019-01-01 DIAGNOSIS — I26.94 MULTIPLE SUBSEGMENTAL PULMONARY EMBOLI WITHOUT ACUTE COR PULMONALE (HCC): Primary | ICD-10-CM

## 2019-01-01 LAB
ADENOVIRUS BY PCR: NOT DETECTED
ALBUMIN SERPL-MCNC: 3.3 G/DL (ref 3.5–5.2)
ALBUMIN SERPL-MCNC: 3.6 G/DL (ref 3.5–5.2)
ALBUMIN SERPL-MCNC: 3.9 G/DL (ref 3.5–5.2)
ALP BLD-CCNC: 120 U/L (ref 40–129)
ALP BLD-CCNC: 98 U/L (ref 40–129)
ALP BLD-CCNC: 98 U/L (ref 40–129)
ALT SERPL-CCNC: 16 U/L (ref 0–40)
ALT SERPL-CCNC: 17 U/L (ref 0–40)
ALT SERPL-CCNC: 26 U/L (ref 0–40)
ANION GAP SERPL CALCULATED.3IONS-SCNC: 11 MMOL/L (ref 7–16)
ANION GAP SERPL CALCULATED.3IONS-SCNC: 13 MMOL/L (ref 7–16)
ANION GAP SERPL CALCULATED.3IONS-SCNC: 14 MMOL/L (ref 7–16)
ANION GAP SERPL CALCULATED.3IONS-SCNC: 15 MMOL/L (ref 7–16)
ANION GAP SERPL CALCULATED.3IONS-SCNC: 16 MMOL/L (ref 7–16)
ANION GAP SERPL CALCULATED.3IONS-SCNC: 17 MMOL/L (ref 7–16)
ANION GAP SERPL CALCULATED.3IONS-SCNC: 9 MMOL/L (ref 7–16)
APTT: 28.7 SEC (ref 24.5–35.1)
APTT: 30.3 SEC (ref 24.5–35.1)
APTT: 31.2 SEC (ref 24.5–35.1)
APTT: 33.1 SEC (ref 24.5–35.1)
APTT: 34.4 SEC (ref 24.5–35.1)
AST SERPL-CCNC: 15 U/L (ref 0–39)
AST SERPL-CCNC: 17 U/L (ref 0–39)
AST SERPL-CCNC: 17 U/L (ref 0–39)
BACTERIA: NORMAL /HPF
BASOPHILS ABSOLUTE: 0.04 E9/L (ref 0–0.2)
BASOPHILS ABSOLUTE: 0.05 E9/L (ref 0–0.2)
BASOPHILS ABSOLUTE: 0.06 E9/L (ref 0–0.2)
BASOPHILS ABSOLUTE: 0.17 E9/L (ref 0–0.2)
BASOPHILS RELATIVE PERCENT: 0.3 % (ref 0–2)
BASOPHILS RELATIVE PERCENT: 0.7 % (ref 0–2)
BASOPHILS RELATIVE PERCENT: 0.9 % (ref 0–2)
BASOPHILS RELATIVE PERCENT: 1.9 % (ref 0–2)
BILIRUB SERPL-MCNC: 0.4 MG/DL (ref 0–1.2)
BILIRUB SERPL-MCNC: 0.5 MG/DL (ref 0–1.2)
BILIRUB SERPL-MCNC: 0.9 MG/DL (ref 0–1.2)
BILIRUBIN DIRECT: <0.2 MG/DL (ref 0–0.3)
BILIRUBIN URINE: NEGATIVE
BILIRUBIN, INDIRECT: ABNORMAL MG/DL (ref 0–1)
BLOOD, URINE: NEGATIVE
BORDETELLA PARAPERTUSSIS BY PCR: NOT DETECTED
BORDETELLA PERTUSSIS BY PCR: NOT DETECTED
BUN BLDV-MCNC: 10 MG/DL (ref 8–23)
BUN BLDV-MCNC: 10 MG/DL (ref 8–23)
BUN BLDV-MCNC: 11 MG/DL (ref 8–23)
BUN BLDV-MCNC: 13 MG/DL (ref 8–23)
BUN BLDV-MCNC: 14 MG/DL (ref 8–23)
BUN BLDV-MCNC: 14 MG/DL (ref 8–23)
BUN BLDV-MCNC: 15 MG/DL (ref 8–23)
BUN BLDV-MCNC: 17 MG/DL (ref 8–23)
BUN BLDV-MCNC: 22 MG/DL (ref 8–23)
CALCIUM SERPL-MCNC: 7.9 MG/DL (ref 8.6–10.2)
CALCIUM SERPL-MCNC: 8 MG/DL (ref 8.6–10.2)
CALCIUM SERPL-MCNC: 8.3 MG/DL (ref 8.6–10.2)
CALCIUM SERPL-MCNC: 8.5 MG/DL (ref 8.6–10.2)
CALCIUM SERPL-MCNC: 8.5 MG/DL (ref 8.6–10.2)
CALCIUM SERPL-MCNC: 8.8 MG/DL (ref 8.6–10.2)
CALCIUM SERPL-MCNC: 9 MG/DL (ref 8.6–10.2)
CHLAMYDOPHILIA PNEUMONIAE BY PCR: NOT DETECTED
CHLORIDE BLD-SCNC: 101 MMOL/L (ref 98–107)
CHLORIDE BLD-SCNC: 102 MMOL/L (ref 98–107)
CHLORIDE BLD-SCNC: 103 MMOL/L (ref 98–107)
CHLORIDE BLD-SCNC: 104 MMOL/L (ref 98–107)
CHLORIDE BLD-SCNC: 104 MMOL/L (ref 98–107)
CHLORIDE BLD-SCNC: 105 MMOL/L (ref 98–107)
CHLORIDE BLD-SCNC: 105 MMOL/L (ref 98–107)
CLARITY: CLEAR
CO2: 20 MMOL/L (ref 22–29)
CO2: 20 MMOL/L (ref 22–29)
CO2: 22 MMOL/L (ref 22–29)
CO2: 24 MMOL/L (ref 22–29)
CO2: 25 MMOL/L (ref 22–29)
CO2: 25 MMOL/L (ref 22–29)
CO2: 29 MMOL/L (ref 22–29)
COLOR: YELLOW
CORONAVIRUS 229E BY PCR: NOT DETECTED
CORONAVIRUS HKU1 BY PCR: NOT DETECTED
CORONAVIRUS NL63 BY PCR: NOT DETECTED
CORONAVIRUS OC43 BY PCR: NOT DETECTED
CREAT SERPL-MCNC: 0.8 MG/DL (ref 0.7–1.2)
CREAT SERPL-MCNC: 0.8 MG/DL (ref 0.7–1.2)
CREAT SERPL-MCNC: 0.9 MG/DL (ref 0.7–1.2)
CREAT SERPL-MCNC: 1 MG/DL (ref 0.7–1.2)
CREAT SERPL-MCNC: 1.2 MG/DL (ref 0.7–1.2)
CREAT SERPL-MCNC: 1.2 MG/DL (ref 0.7–1.2)
CREAT SERPL-MCNC: 1.3 MG/DL (ref 0.7–1.2)
EKG ATRIAL RATE: 88 BPM
EKG Q-T INTERVAL: 358 MS
EKG QRS DURATION: 86 MS
EKG QTC CALCULATION (BAZETT): 461 MS
EKG R AXIS: -37 DEGREES
EKG T AXIS: 81 DEGREES
EKG VENTRICULAR RATE: 100 BPM
EOSINOPHILS ABSOLUTE: 0.06 E9/L (ref 0.05–0.5)
EOSINOPHILS ABSOLUTE: 0.08 E9/L (ref 0.05–0.5)
EOSINOPHILS ABSOLUTE: 0.14 E9/L (ref 0.05–0.5)
EOSINOPHILS ABSOLUTE: 0.95 E9/L (ref 0.05–0.5)
EOSINOPHILS RELATIVE PERCENT: 0.6 % (ref 0–6)
EOSINOPHILS RELATIVE PERCENT: 0.9 % (ref 0–6)
EOSINOPHILS RELATIVE PERCENT: 10.4 % (ref 0–6)
EOSINOPHILS RELATIVE PERCENT: 2 % (ref 0–6)
GFR AFRICAN AMERICAN: >60
GFR NON-AFRICAN AMERICAN: 55 ML/MIN/1.73
GFR NON-AFRICAN AMERICAN: >60 ML/MIN/1.73
GLUCOSE BLD-MCNC: 100 MG/DL (ref 74–99)
GLUCOSE BLD-MCNC: 102 MG/DL (ref 74–99)
GLUCOSE BLD-MCNC: 107 MG/DL (ref 74–99)
GLUCOSE BLD-MCNC: 89 MG/DL (ref 74–99)
GLUCOSE BLD-MCNC: 91 MG/DL (ref 74–99)
GLUCOSE BLD-MCNC: 92 MG/DL (ref 74–99)
GLUCOSE BLD-MCNC: 96 MG/DL (ref 74–99)
GLUCOSE BLD-MCNC: 97 MG/DL (ref 74–99)
GLUCOSE BLD-MCNC: 98 MG/DL (ref 74–99)
GLUCOSE URINE: NEGATIVE MG/DL
HCT VFR BLD CALC: 37.3 % (ref 37–54)
HCT VFR BLD CALC: 37.5 % (ref 37–54)
HCT VFR BLD CALC: 38.1 % (ref 37–54)
HCT VFR BLD CALC: 38.5 % (ref 37–54)
HCT VFR BLD CALC: 39.3 % (ref 37–54)
HCT VFR BLD CALC: 40.7 % (ref 37–54)
HCT VFR BLD CALC: 45.5 % (ref 37–54)
HCT VFR BLD CALC: 47.8 % (ref 37–54)
HCT VFR BLD CALC: 49.4 % (ref 37–54)
HEMOGLOBIN: 11.7 G/DL (ref 12.5–16.5)
HEMOGLOBIN: 11.8 G/DL (ref 12.5–16.5)
HEMOGLOBIN: 12 G/DL (ref 12.5–16.5)
HEMOGLOBIN: 12.1 G/DL (ref 12.5–16.5)
HEMOGLOBIN: 12.5 G/DL (ref 12.5–16.5)
HEMOGLOBIN: 12.6 G/DL (ref 12.5–16.5)
HEMOGLOBIN: 14.6 G/DL (ref 12.5–16.5)
HEMOGLOBIN: 14.6 G/DL (ref 12.5–16.5)
HEMOGLOBIN: 15.3 G/DL (ref 12.5–16.5)
HUMAN METAPNEUMOVIRUS BY PCR: NOT DETECTED
HUMAN RHINOVIRUS/ENTEROVIRUS BY PCR: NOT DETECTED
IMMATURE GRANULOCYTES #: 0.06 E9/L
IMMATURE GRANULOCYTES #: 0.08 E9/L
IMMATURE GRANULOCYTES #: 0.1 E9/L
IMMATURE GRANULOCYTES #: 0.34 E9/L
IMMATURE GRANULOCYTES %: 0.7 % (ref 0–5)
IMMATURE GRANULOCYTES %: 0.7 % (ref 0–5)
IMMATURE GRANULOCYTES %: 1.1 % (ref 0–5)
IMMATURE GRANULOCYTES %: 5 % (ref 0–5)
INFLUENZA A BY PCR: NOT DETECTED
INFLUENZA B BY PCR: NOT DETECTED
INR BLD: 1
INR BLD: 1
INR BLD: 1.1
INR BLD: 1.1
INR BLD: 1.2
INR BLD: 1.2
KETONES, URINE: NEGATIVE MG/DL
LEUKOCYTE ESTERASE, URINE: NEGATIVE
LV EF: 70 %
LVEF MODALITY: NORMAL
LYMPHOCYTES ABSOLUTE: 0.83 E9/L (ref 1.5–4)
LYMPHOCYTES ABSOLUTE: 1 E9/L (ref 1.5–4)
LYMPHOCYTES ABSOLUTE: 1.18 E9/L (ref 1.5–4)
LYMPHOCYTES ABSOLUTE: 2.03 E9/L (ref 1.5–4)
LYMPHOCYTES RELATIVE PERCENT: 12.2 % (ref 20–42)
LYMPHOCYTES RELATIVE PERCENT: 14.3 % (ref 20–42)
LYMPHOCYTES RELATIVE PERCENT: 22.1 % (ref 20–42)
LYMPHOCYTES RELATIVE PERCENT: 8.4 % (ref 20–42)
MAGNESIUM: 1.9 MG/DL (ref 1.6–2.6)
MAGNESIUM: 1.9 MG/DL (ref 1.6–2.6)
MAGNESIUM: 2 MG/DL (ref 1.6–2.6)
MAGNESIUM: 2.1 MG/DL (ref 1.6–2.6)
MAGNESIUM: 2.2 MG/DL (ref 1.6–2.6)
MCH RBC QN AUTO: 28.9 PG (ref 26–35)
MCH RBC QN AUTO: 29.3 PG (ref 26–35)
MCH RBC QN AUTO: 29.5 PG (ref 26–35)
MCH RBC QN AUTO: 30.1 PG (ref 26–35)
MCH RBC QN AUTO: 30.1 PG (ref 26–35)
MCH RBC QN AUTO: 30.5 PG (ref 26–35)
MCH RBC QN AUTO: 30.6 PG (ref 26–35)
MCH RBC QN AUTO: 30.9 PG (ref 26–35)
MCH RBC QN AUTO: 31.3 PG (ref 26–35)
MCHC RBC AUTO-ENTMCNC: 30.5 % (ref 32–34.5)
MCHC RBC AUTO-ENTMCNC: 30.7 % (ref 32–34.5)
MCHC RBC AUTO-ENTMCNC: 31 % (ref 32–34.5)
MCHC RBC AUTO-ENTMCNC: 31 % (ref 32–34.5)
MCHC RBC AUTO-ENTMCNC: 31.4 % (ref 32–34.5)
MCHC RBC AUTO-ENTMCNC: 31.5 % (ref 32–34.5)
MCHC RBC AUTO-ENTMCNC: 31.8 % (ref 32–34.5)
MCHC RBC AUTO-ENTMCNC: 32.1 % (ref 32–34.5)
MCHC RBC AUTO-ENTMCNC: 32.2 % (ref 32–34.5)
MCV RBC AUTO: 91.9 FL (ref 80–99.9)
MCV RBC AUTO: 94.5 FL (ref 80–99.9)
MCV RBC AUTO: 94.7 FL (ref 80–99.9)
MCV RBC AUTO: 97 FL (ref 80–99.9)
MCV RBC AUTO: 97 FL (ref 80–99.9)
MCV RBC AUTO: 97.1 FL (ref 80–99.9)
MCV RBC AUTO: 97.1 FL (ref 80–99.9)
MCV RBC AUTO: 97.2 FL (ref 80–99.9)
MCV RBC AUTO: 97.9 FL (ref 80–99.9)
METER GLUCOSE: 92 MG/DL (ref 74–99)
METER GLUCOSE: 93 MG/DL (ref 74–99)
METER GLUCOSE: 97 MG/DL (ref 74–99)
METER GLUCOSE: 98 MG/DL (ref 74–99)
MONOCYTES ABSOLUTE: 0.3 E9/L (ref 0.1–0.95)
MONOCYTES ABSOLUTE: 0.39 E9/L (ref 0.1–0.95)
MONOCYTES ABSOLUTE: 0.61 E9/L (ref 0.1–0.95)
MONOCYTES ABSOLUTE: 0.71 E9/L (ref 0.1–0.95)
MONOCYTES RELATIVE PERCENT: 4.4 % (ref 2–12)
MONOCYTES RELATIVE PERCENT: 5 % (ref 2–12)
MONOCYTES RELATIVE PERCENT: 5.6 % (ref 2–12)
MONOCYTES RELATIVE PERCENT: 6.7 % (ref 2–12)
MRSA CULTURE ONLY: NORMAL
MYCOPLASMA PNEUMONIAE BY PCR: NOT DETECTED
NEUTROPHILS ABSOLUTE: 12 E9/L (ref 1.8–7.3)
NEUTROPHILS ABSOLUTE: 5.23 E9/L (ref 1.8–7.3)
NEUTROPHILS ABSOLUTE: 5.3 E9/L (ref 1.8–7.3)
NEUTROPHILS ABSOLUTE: 5.35 E9/L (ref 1.8–7.3)
NEUTROPHILS RELATIVE PERCENT: 58.2 % (ref 43–80)
NEUTROPHILS RELATIVE PERCENT: 76.1 % (ref 43–80)
NEUTROPHILS RELATIVE PERCENT: 76.8 % (ref 43–80)
NEUTROPHILS RELATIVE PERCENT: 85 % (ref 43–80)
NITRITE, URINE: NEGATIVE
PARAINFLUENZA VIRUS 1 BY PCR: NOT DETECTED
PARAINFLUENZA VIRUS 2 BY PCR: NOT DETECTED
PARAINFLUENZA VIRUS 3 BY PCR: NOT DETECTED
PARAINFLUENZA VIRUS 4 BY PCR: NOT DETECTED
PDW BLD-RTO: 13.1 FL (ref 11.5–15)
PDW BLD-RTO: 13.3 FL (ref 11.5–15)
PDW BLD-RTO: 13.6 FL (ref 11.5–15)
PDW BLD-RTO: 15.9 FL (ref 11.5–15)
PDW BLD-RTO: 16.1 FL (ref 11.5–15)
PDW BLD-RTO: 16.2 FL (ref 11.5–15)
PDW BLD-RTO: 16.3 FL (ref 11.5–15)
PH UA: 8 (ref 5–9)
PHOSPHORUS: 2.8 MG/DL (ref 2.5–4.5)
PLATELET # BLD: 179 E9/L (ref 130–450)
PLATELET # BLD: 190 E9/L (ref 130–450)
PLATELET # BLD: 197 E9/L (ref 130–450)
PLATELET # BLD: 206 E9/L (ref 130–450)
PLATELET # BLD: 221 E9/L (ref 130–450)
PLATELET # BLD: 224 E9/L (ref 130–450)
PLATELET # BLD: 245 E9/L (ref 130–450)
PLATELET # BLD: 263 E9/L (ref 130–450)
PLATELET # BLD: 332 E9/L (ref 130–450)
PMV BLD AUTO: 8.6 FL (ref 7–12)
PMV BLD AUTO: 8.6 FL (ref 7–12)
PMV BLD AUTO: 8.7 FL (ref 7–12)
PMV BLD AUTO: 8.8 FL (ref 7–12)
PMV BLD AUTO: 8.8 FL (ref 7–12)
PMV BLD AUTO: 8.9 FL (ref 7–12)
PMV BLD AUTO: 9.4 FL (ref 7–12)
PMV BLD AUTO: 9.4 FL (ref 7–12)
PMV BLD AUTO: 9.5 FL (ref 7–12)
POTASSIUM REFLEX MAGNESIUM: 3.7 MMOL/L (ref 3.5–5)
POTASSIUM SERPL-SCNC: 3.6 MMOL/L (ref 3.5–5)
POTASSIUM SERPL-SCNC: 3.6 MMOL/L (ref 3.5–5)
POTASSIUM SERPL-SCNC: 3.7 MMOL/L (ref 3.5–5)
POTASSIUM SERPL-SCNC: 3.7 MMOL/L (ref 3.5–5)
POTASSIUM SERPL-SCNC: 3.9 MMOL/L (ref 3.5–5)
POTASSIUM SERPL-SCNC: 4 MMOL/L (ref 3.5–5)
POTASSIUM SERPL-SCNC: 4.1 MMOL/L (ref 3.5–5)
POTASSIUM SERPL-SCNC: 4.3 MMOL/L (ref 3.5–5)
PRO-BNP: 878 PG/ML (ref 0–125)
PROTEIN UA: NEGATIVE MG/DL
PROTHROMBIN TIME: 11.8 SEC (ref 9.3–12.4)
PROTHROMBIN TIME: 11.8 SEC (ref 9.3–12.4)
PROTHROMBIN TIME: 12 SEC (ref 9.3–12.4)
PROTHROMBIN TIME: 12.6 SEC (ref 9.3–12.4)
PROTHROMBIN TIME: 13 SEC (ref 9.3–12.4)
PROTHROMBIN TIME: 13.4 SEC (ref 9.3–12.4)
RBC # BLD: 3.84 E12/L (ref 3.8–5.8)
RBC # BLD: 3.86 E12/L (ref 3.8–5.8)
RBC # BLD: 3.89 E12/L (ref 3.8–5.8)
RBC # BLD: 3.97 E12/L (ref 3.8–5.8)
RBC # BLD: 4.05 E12/L (ref 3.8–5.8)
RBC # BLD: 4.19 E12/L (ref 3.8–5.8)
RBC # BLD: 4.95 E12/L (ref 3.8–5.8)
RBC # BLD: 5.05 E12/L (ref 3.8–5.8)
RBC # BLD: 5.23 E12/L (ref 3.8–5.8)
RBC UA: NORMAL /HPF (ref 0–2)
RESPIRATORY SYNCYTIAL VIRUS BY PCR: NOT DETECTED
SODIUM BLD-SCNC: 136 MMOL/L (ref 132–146)
SODIUM BLD-SCNC: 137 MMOL/L (ref 132–146)
SODIUM BLD-SCNC: 137 MMOL/L (ref 132–146)
SODIUM BLD-SCNC: 140 MMOL/L (ref 132–146)
SODIUM BLD-SCNC: 140 MMOL/L (ref 132–146)
SODIUM BLD-SCNC: 141 MMOL/L (ref 132–146)
SODIUM BLD-SCNC: 143 MMOL/L (ref 132–146)
SODIUM BLD-SCNC: 143 MMOL/L (ref 132–146)
SODIUM BLD-SCNC: 145 MMOL/L (ref 132–146)
SPECIFIC GRAVITY UA: 1.01 (ref 1–1.03)
TOTAL PROTEIN: 5.5 G/DL (ref 6.4–8.3)
TOTAL PROTEIN: 6.6 G/DL (ref 6.4–8.3)
TOTAL PROTEIN: 7.1 G/DL (ref 6.4–8.3)
TROPONIN: <0.01 NG/ML (ref 0–0.03)
UROBILINOGEN, URINE: 1 E.U./DL
WBC # BLD: 14.1 E9/L (ref 4.5–11.5)
WBC # BLD: 6 E9/L (ref 4.5–11.5)
WBC # BLD: 6.4 E9/L (ref 4.5–11.5)
WBC # BLD: 6.8 E9/L (ref 4.5–11.5)
WBC # BLD: 6.9 E9/L (ref 4.5–11.5)
WBC # BLD: 6.9 E9/L (ref 4.5–11.5)
WBC # BLD: 7 E9/L (ref 4.5–11.5)
WBC # BLD: 7.4 E9/L (ref 4.5–11.5)
WBC # BLD: 9.2 E9/L (ref 4.5–11.5)
WBC UA: NORMAL /HPF (ref 0–5)

## 2019-01-01 PROCEDURE — 85610 PROTHROMBIN TIME: CPT

## 2019-01-01 PROCEDURE — 97530 THERAPEUTIC ACTIVITIES: CPT | Performed by: OCCUPATIONAL THERAPIST

## 2019-01-01 PROCEDURE — 83735 ASSAY OF MAGNESIUM: CPT

## 2019-01-01 PROCEDURE — 2500000003 HC RX 250 WO HCPCS: Performed by: SURGERY

## 2019-01-01 PROCEDURE — 85730 THROMBOPLASTIN TIME PARTIAL: CPT

## 2019-01-01 PROCEDURE — 6360000002 HC RX W HCPCS: Performed by: INTERNAL MEDICINE

## 2019-01-01 PROCEDURE — 82962 GLUCOSE BLOOD TEST: CPT

## 2019-01-01 PROCEDURE — 2580000003 HC RX 258: Performed by: INTERNAL MEDICINE

## 2019-01-01 PROCEDURE — 80053 COMPREHEN METABOLIC PANEL: CPT

## 2019-01-01 PROCEDURE — 80048 BASIC METABOLIC PNL TOTAL CA: CPT

## 2019-01-01 PROCEDURE — 10005 FNA BX W/US GDN 1ST LES: CPT

## 2019-01-01 PROCEDURE — 2000000000 HC ICU R&B

## 2019-01-01 PROCEDURE — 99223 1ST HOSP IP/OBS HIGH 75: CPT | Performed by: CLINICAL NURSE SPECIALIST

## 2019-01-01 PROCEDURE — 2060000000 HC ICU INTERMEDIATE R&B

## 2019-01-01 PROCEDURE — 6360000002 HC RX W HCPCS: Performed by: NURSE ANESTHETIST, CERTIFIED REGISTERED

## 2019-01-01 PROCEDURE — 6370000000 HC RX 637 (ALT 250 FOR IP): Performed by: INTERNAL MEDICINE

## 2019-01-01 PROCEDURE — 84484 ASSAY OF TROPONIN QUANT: CPT

## 2019-01-01 PROCEDURE — 87081 CULTURE SCREEN ONLY: CPT

## 2019-01-01 PROCEDURE — 96365 THER/PROPH/DIAG IV INF INIT: CPT

## 2019-01-01 PROCEDURE — 85027 COMPLETE CBC AUTOMATED: CPT

## 2019-01-01 PROCEDURE — 85025 COMPLETE CBC W/AUTO DIFF WBC: CPT

## 2019-01-01 PROCEDURE — 81001 URINALYSIS AUTO W/SCOPE: CPT

## 2019-01-01 PROCEDURE — 36591 DRAW BLOOD OFF VENOUS DEVICE: CPT

## 2019-01-01 PROCEDURE — C9113 INJ PANTOPRAZOLE SODIUM, VIA: HCPCS | Performed by: INTERNAL MEDICINE

## 2019-01-01 PROCEDURE — 99233 SBSQ HOSP IP/OBS HIGH 50: CPT | Performed by: INTERNAL MEDICINE

## 2019-01-01 PROCEDURE — 36415 COLL VENOUS BLD VENIPUNCTURE: CPT

## 2019-01-01 PROCEDURE — 2580000003 HC RX 258: Performed by: SURGERY

## 2019-01-01 PROCEDURE — 37191 INS ENDOVAS VENA CAVA FILTR: CPT | Performed by: SURGERY

## 2019-01-01 PROCEDURE — 2500000003 HC RX 250 WO HCPCS: Performed by: INTERNAL MEDICINE

## 2019-01-01 PROCEDURE — 99232 SBSQ HOSP IP/OBS MODERATE 35: CPT | Performed by: INTERNAL MEDICINE

## 2019-01-01 PROCEDURE — 6360000004 HC RX CONTRAST MEDICATION: Performed by: RADIOLOGY

## 2019-01-01 PROCEDURE — 87040 BLOOD CULTURE FOR BACTERIA: CPT

## 2019-01-01 PROCEDURE — 2580000003 HC RX 258: Performed by: STUDENT IN AN ORGANIZED HEALTH CARE EDUCATION/TRAINING PROGRAM

## 2019-01-01 PROCEDURE — 88305 TISSUE EXAM BY PATHOLOGIST: CPT

## 2019-01-01 PROCEDURE — 94640 AIRWAY INHALATION TREATMENT: CPT

## 2019-01-01 PROCEDURE — 83880 ASSAY OF NATRIURETIC PEPTIDE: CPT

## 2019-01-01 PROCEDURE — 06H03DZ INSERTION OF INTRALUMINAL DEVICE INTO INFERIOR VENA CAVA, PERCUTANEOUS APPROACH: ICD-10-PCS | Performed by: SURGERY

## 2019-01-01 PROCEDURE — 2500000003 HC RX 250 WO HCPCS: Performed by: RADIOLOGY

## 2019-01-01 PROCEDURE — 2580000003 HC RX 258: Performed by: NURSE ANESTHETIST, CERTIFIED REGISTERED

## 2019-01-01 PROCEDURE — 88173 CYTOPATH EVAL FNA REPORT: CPT

## 2019-01-01 PROCEDURE — 99231 SBSQ HOSP IP/OBS SF/LOW 25: CPT | Performed by: CLINICAL NURSE SPECIALIST

## 2019-01-01 PROCEDURE — C1769 GUIDE WIRE: HCPCS | Performed by: SURGERY

## 2019-01-01 PROCEDURE — 6360000004 HC RX CONTRAST MEDICATION: Performed by: INTERNAL MEDICINE

## 2019-01-01 PROCEDURE — 76882 US LMTD JT/FCL EVL NVASC XTR: CPT

## 2019-01-01 PROCEDURE — 93971 EXTREMITY STUDY: CPT

## 2019-01-01 PROCEDURE — 2140000000 HC CCU INTERMEDIATE R&B

## 2019-01-01 PROCEDURE — 3700000001 HC ADD 15 MINUTES (ANESTHESIA): Performed by: SURGERY

## 2019-01-01 PROCEDURE — 70470 CT HEAD/BRAIN W/O & W/DYE: CPT

## 2019-01-01 PROCEDURE — 6360000002 HC RX W HCPCS: Performed by: STUDENT IN AN ORGANIZED HEALTH CARE EDUCATION/TRAINING PROGRAM

## 2019-01-01 PROCEDURE — A0425 GROUND MILEAGE: HCPCS

## 2019-01-01 PROCEDURE — 94664 DEMO&/EVAL PT USE INHALER: CPT

## 2019-01-01 PROCEDURE — 6370000000 HC RX 637 (ALT 250 FOR IP): Performed by: STUDENT IN AN ORGANIZED HEALTH CARE EDUCATION/TRAINING PROGRAM

## 2019-01-01 PROCEDURE — 93970 EXTREMITY STUDY: CPT

## 2019-01-01 PROCEDURE — 80076 HEPATIC FUNCTION PANEL: CPT

## 2019-01-01 PROCEDURE — 3700000000 HC ANESTHESIA ATTENDED CARE: Performed by: SURGERY

## 2019-01-01 PROCEDURE — 0100U HC RESPIRPTHGN MULT REV TRANS & AMP PRB TECH 21 TRGT: CPT

## 2019-01-01 PROCEDURE — 6360000002 HC RX W HCPCS: Performed by: SURGERY

## 2019-01-01 PROCEDURE — 99285 EMERGENCY DEPT VISIT HI MDM: CPT

## 2019-01-01 PROCEDURE — A0426 ALS 1: HCPCS

## 2019-01-01 PROCEDURE — 49180 BIOPSY ABDOMINAL MASS: CPT

## 2019-01-01 PROCEDURE — 3600000012 HC SURGERY LEVEL 2 ADDTL 15MIN: Performed by: SURGERY

## 2019-01-01 PROCEDURE — 3600000002 HC SURGERY LEVEL 2 BASE: Performed by: SURGERY

## 2019-01-01 PROCEDURE — 3209999900 FLUORO FOR SURGICAL PROCEDURES

## 2019-01-01 PROCEDURE — 2700000000 HC OXYGEN THERAPY PER DAY

## 2019-01-01 PROCEDURE — 71275 CT ANGIOGRAPHY CHEST: CPT

## 2019-01-01 PROCEDURE — 84100 ASSAY OF PHOSPHORUS: CPT

## 2019-01-01 PROCEDURE — 99232 SBSQ HOSP IP/OBS MODERATE 35: CPT | Performed by: SURGERY

## 2019-01-01 PROCEDURE — 93005 ELECTROCARDIOGRAM TRACING: CPT | Performed by: STUDENT IN AN ORGANIZED HEALTH CARE EDUCATION/TRAINING PROGRAM

## 2019-01-01 PROCEDURE — C1880 VENA CAVA FILTER: HCPCS | Performed by: SURGERY

## 2019-01-01 PROCEDURE — 71045 X-RAY EXAM CHEST 1 VIEW: CPT

## 2019-01-01 PROCEDURE — C8929 TTE W OR WO FOL WCON,DOPPLER: HCPCS

## 2019-01-01 DEVICE — FILTER VASC L50MM DIA30MM INTRO 7FR L65CM VENA CAVA FEM W: Type: IMPLANTABLE DEVICE | Site: GROIN | Status: FUNCTIONAL

## 2019-01-01 RX ORDER — METOPROLOL TARTRATE 5 MG/5ML
2.5 INJECTION INTRAVENOUS ONCE
Status: COMPLETED | OUTPATIENT
Start: 2019-01-01 | End: 2019-01-01

## 2019-01-01 RX ORDER — SODIUM CHLORIDE, SODIUM LACTATE, POTASSIUM CHLORIDE, CALCIUM CHLORIDE 600; 310; 30; 20 MG/100ML; MG/100ML; MG/100ML; MG/100ML
INJECTION, SOLUTION INTRAVENOUS CONTINUOUS PRN
Status: DISCONTINUED | OUTPATIENT
Start: 2019-01-01 | End: 2019-01-01 | Stop reason: SDUPTHER

## 2019-01-01 RX ORDER — ACETAMINOPHEN 500 MG
1000 TABLET ORAL ONCE
Status: DISCONTINUED | OUTPATIENT
Start: 2019-01-01 | End: 2019-01-01 | Stop reason: HOSPADM

## 2019-01-01 RX ORDER — SODIUM CHLORIDE 0.9 % (FLUSH) 0.9 %
10 SYRINGE (ML) INJECTION EVERY 12 HOURS SCHEDULED
Status: DISCONTINUED | OUTPATIENT
Start: 2019-01-01 | End: 2020-01-01 | Stop reason: HOSPADM

## 2019-01-01 RX ORDER — LABETALOL HYDROCHLORIDE 5 MG/ML
5 INJECTION, SOLUTION INTRAVENOUS EVERY 10 MIN PRN
Status: DISCONTINUED | OUTPATIENT
Start: 2019-01-01 | End: 2019-01-01 | Stop reason: HOSPADM

## 2019-01-01 RX ORDER — DEXAMETHASONE 2 MG/1
TABLET ORAL
Status: ON HOLD | COMMUNITY
Start: 2019-01-01 | End: 2019-01-01 | Stop reason: ALTCHOICE

## 2019-01-01 RX ORDER — METOPROLOL TARTRATE 50 MG/1
50 TABLET, FILM COATED ORAL 2 TIMES DAILY
Status: DISCONTINUED | OUTPATIENT
Start: 2019-01-01 | End: 2020-01-01

## 2019-01-01 RX ORDER — NICOTINE POLACRILEX 4 MG
15 LOZENGE BUCCAL PRN
Status: DISCONTINUED | OUTPATIENT
Start: 2019-01-01 | End: 2020-01-01 | Stop reason: HOSPADM

## 2019-01-01 RX ORDER — LIDOCAINE HYDROCHLORIDE 10 MG/ML
10 INJECTION, SOLUTION EPIDURAL; INFILTRATION; INTRACAUDAL; PERINEURAL ONCE
Status: COMPLETED | OUTPATIENT
Start: 2019-01-01 | End: 2019-01-01

## 2019-01-01 RX ORDER — DOCUSATE SODIUM 100 MG/1
100 CAPSULE, LIQUID FILLED ORAL DAILY
Status: DISCONTINUED | OUTPATIENT
Start: 2019-01-01 | End: 2020-01-01 | Stop reason: HOSPADM

## 2019-01-01 RX ORDER — POTASSIUM CHLORIDE 20 MEQ/1
20 TABLET, EXTENDED RELEASE ORAL ONCE
Status: COMPLETED | OUTPATIENT
Start: 2019-01-01 | End: 2019-01-01

## 2019-01-01 RX ORDER — PROMETHAZINE HYDROCHLORIDE 25 MG/ML
25 INJECTION, SOLUTION INTRAMUSCULAR; INTRAVENOUS PRN
Status: DISCONTINUED | OUTPATIENT
Start: 2019-01-01 | End: 2019-01-01 | Stop reason: HOSPADM

## 2019-01-01 RX ORDER — SODIUM CHLORIDE 0.9 % (FLUSH) 0.9 %
10 SYRINGE (ML) INJECTION PRN
Status: DISCONTINUED | OUTPATIENT
Start: 2019-01-01 | End: 2020-01-01 | Stop reason: HOSPADM

## 2019-01-01 RX ORDER — ACETAMINOPHEN 325 MG/1
650 TABLET ORAL EVERY 4 HOURS PRN
Status: DISCONTINUED | OUTPATIENT
Start: 2019-01-01 | End: 2020-01-01 | Stop reason: HOSPADM

## 2019-01-01 RX ORDER — FUROSEMIDE 10 MG/ML
20 INJECTION INTRAMUSCULAR; INTRAVENOUS ONCE
Status: COMPLETED | OUTPATIENT
Start: 2019-01-01 | End: 2019-01-01

## 2019-01-01 RX ORDER — DEXTROSE MONOHYDRATE 50 MG/ML
100 INJECTION, SOLUTION INTRAVENOUS PRN
Status: DISCONTINUED | OUTPATIENT
Start: 2019-01-01 | End: 2020-01-01 | Stop reason: HOSPADM

## 2019-01-01 RX ORDER — ONDANSETRON 2 MG/ML
4 INJECTION INTRAMUSCULAR; INTRAVENOUS EVERY 6 HOURS PRN
Status: DISCONTINUED | OUTPATIENT
Start: 2019-01-01 | End: 2020-01-01 | Stop reason: HOSPADM

## 2019-01-01 RX ORDER — 0.9 % SODIUM CHLORIDE 0.9 %
1000 INTRAVENOUS SOLUTION INTRAVENOUS ONCE
Status: DISCONTINUED | OUTPATIENT
Start: 2019-01-01 | End: 2019-01-01

## 2019-01-01 RX ORDER — CEFAZOLIN SODIUM 1 G/3ML
INJECTION, POWDER, FOR SOLUTION INTRAMUSCULAR; INTRAVENOUS PRN
Status: DISCONTINUED | OUTPATIENT
Start: 2019-01-01 | End: 2019-01-01 | Stop reason: SDUPTHER

## 2019-01-01 RX ORDER — IPRATROPIUM BROMIDE AND ALBUTEROL SULFATE 2.5; .5 MG/3ML; MG/3ML
1 SOLUTION RESPIRATORY (INHALATION) 4 TIMES DAILY
Status: DISCONTINUED | OUTPATIENT
Start: 2019-01-01 | End: 2020-01-01 | Stop reason: HOSPADM

## 2019-01-01 RX ORDER — MIDAZOLAM HYDROCHLORIDE 1 MG/ML
INJECTION INTRAMUSCULAR; INTRAVENOUS PRN
Status: DISCONTINUED | OUTPATIENT
Start: 2019-01-01 | End: 2019-01-01 | Stop reason: SDUPTHER

## 2019-01-01 RX ORDER — PANTOPRAZOLE SODIUM 40 MG/10ML
40 INJECTION, POWDER, LYOPHILIZED, FOR SOLUTION INTRAVENOUS DAILY
Status: DISCONTINUED | OUTPATIENT
Start: 2019-01-01 | End: 2020-01-01 | Stop reason: HOSPADM

## 2019-01-01 RX ORDER — MEPERIDINE HYDROCHLORIDE 50 MG/ML
12.5 INJECTION INTRAMUSCULAR; INTRAVENOUS; SUBCUTANEOUS EVERY 5 MIN PRN
Status: DISCONTINUED | OUTPATIENT
Start: 2019-01-01 | End: 2019-01-01 | Stop reason: HOSPADM

## 2019-01-01 RX ORDER — LEVETIRACETAM 10 MG/ML
1000 INJECTION INTRAVASCULAR ONCE
Status: COMPLETED | OUTPATIENT
Start: 2019-01-01 | End: 2019-01-01

## 2019-01-01 RX ORDER — DEXTROSE MONOHYDRATE 25 G/50ML
12.5 INJECTION, SOLUTION INTRAVENOUS PRN
Status: DISCONTINUED | OUTPATIENT
Start: 2019-01-01 | End: 2020-01-01 | Stop reason: HOSPADM

## 2019-01-01 RX ORDER — POLYETHYLENE GLYCOL 3350 17 G/17G
17 POWDER, FOR SOLUTION ORAL DAILY PRN
Status: DISCONTINUED | OUTPATIENT
Start: 2019-01-01 | End: 2020-01-01 | Stop reason: HOSPADM

## 2019-01-01 RX ORDER — SODIUM CHLORIDE 9 MG/ML
10 INJECTION INTRAVENOUS DAILY
Status: DISCONTINUED | OUTPATIENT
Start: 2019-01-01 | End: 2020-01-01 | Stop reason: HOSPADM

## 2019-01-01 RX ADMIN — PERFLUTREN: 6.52 INJECTION, SUSPENSION INTRAVENOUS at 09:04

## 2019-01-01 RX ADMIN — IPRATROPIUM BROMIDE AND ALBUTEROL SULFATE 1 AMPULE: 2.5; .5 SOLUTION RESPIRATORY (INHALATION) at 08:28

## 2019-01-01 RX ADMIN — METOPROLOL TARTRATE 50 MG: 50 TABLET, FILM COATED ORAL at 09:16

## 2019-01-01 RX ADMIN — SODIUM CHLORIDE, PRESERVATIVE FREE 10 ML: 5 INJECTION INTRAVENOUS at 22:15

## 2019-01-01 RX ADMIN — IPRATROPIUM BROMIDE AND ALBUTEROL SULFATE 1 AMPULE: 2.5; .5 SOLUTION RESPIRATORY (INHALATION) at 08:06

## 2019-01-01 RX ADMIN — IPRATROPIUM BROMIDE AND ALBUTEROL SULFATE 1 AMPULE: 2.5; .5 SOLUTION RESPIRATORY (INHALATION) at 15:32

## 2019-01-01 RX ADMIN — MIDAZOLAM 2 MG: 1 INJECTION INTRAMUSCULAR; INTRAVENOUS at 14:21

## 2019-01-01 RX ADMIN — PANTOPRAZOLE SODIUM 40 MG: 40 INJECTION, POWDER, FOR SOLUTION INTRAVENOUS at 07:59

## 2019-01-01 RX ADMIN — IOPAMIDOL 100 ML: 755 INJECTION, SOLUTION INTRAVENOUS at 11:21

## 2019-01-01 RX ADMIN — SODIUM CHLORIDE, PRESERVATIVE FREE 10 ML: 5 INJECTION INTRAVENOUS at 19:50

## 2019-01-01 RX ADMIN — CEFAZOLIN 300 MG: 1 INJECTION, POWDER, FOR SOLUTION INTRAMUSCULAR; INTRAVENOUS at 14:27

## 2019-01-01 RX ADMIN — IPRATROPIUM BROMIDE AND ALBUTEROL SULFATE 1 AMPULE: 2.5; .5 SOLUTION RESPIRATORY (INHALATION) at 12:36

## 2019-01-01 RX ADMIN — POTASSIUM CHLORIDE 20 MEQ: 1500 TABLET, EXTENDED RELEASE ORAL at 09:16

## 2019-01-01 RX ADMIN — METOPROLOL TARTRATE 50 MG: 50 TABLET, FILM COATED ORAL at 22:13

## 2019-01-01 RX ADMIN — SODIUM CHLORIDE, PRESERVATIVE FREE 10 ML: 5 INJECTION INTRAVENOUS at 07:59

## 2019-01-01 RX ADMIN — DOCUSATE SODIUM 100 MG: 100 CAPSULE, LIQUID FILLED ORAL at 11:02

## 2019-01-01 RX ADMIN — LEVETIRACETAM 1000 MG: 10 INJECTION INTRAVENOUS at 12:57

## 2019-01-01 RX ADMIN — ACETAMINOPHEN 650 MG: 325 TABLET, FILM COATED ORAL at 22:14

## 2019-01-01 RX ADMIN — IPRATROPIUM BROMIDE AND ALBUTEROL SULFATE 1 AMPULE: 2.5; .5 SOLUTION RESPIRATORY (INHALATION) at 20:20

## 2019-01-01 RX ADMIN — IPRATROPIUM BROMIDE AND ALBUTEROL SULFATE 1 AMPULE: 2.5; .5 SOLUTION RESPIRATORY (INHALATION) at 21:11

## 2019-01-01 RX ADMIN — CALCIUM GLUCONATE 1 G: 98 INJECTION, SOLUTION INTRAVENOUS at 10:13

## 2019-01-01 RX ADMIN — IPRATROPIUM BROMIDE AND ALBUTEROL SULFATE 1 AMPULE: 2.5; .5 SOLUTION RESPIRATORY (INHALATION) at 19:38

## 2019-01-01 RX ADMIN — IPRATROPIUM BROMIDE AND ALBUTEROL SULFATE 1 AMPULE: 2.5; .5 SOLUTION RESPIRATORY (INHALATION) at 20:44

## 2019-01-01 RX ADMIN — METOPROLOL TARTRATE 50 MG: 50 TABLET, FILM COATED ORAL at 07:57

## 2019-01-01 RX ADMIN — SODIUM CHLORIDE, PRESERVATIVE FREE 10 ML: 5 INJECTION INTRAVENOUS at 20:45

## 2019-01-01 RX ADMIN — SODIUM CHLORIDE, PRESERVATIVE FREE 10 ML: 5 INJECTION INTRAVENOUS at 09:17

## 2019-01-01 RX ADMIN — IPRATROPIUM BROMIDE AND ALBUTEROL SULFATE 1 AMPULE: 2.5; .5 SOLUTION RESPIRATORY (INHALATION) at 12:38

## 2019-01-01 RX ADMIN — LIDOCAINE HYDROCHLORIDE 10 ML: 10 INJECTION, SOLUTION EPIDURAL; INFILTRATION; INTRACAUDAL at 09:23

## 2019-01-01 RX ADMIN — IPRATROPIUM BROMIDE AND ALBUTEROL SULFATE 1 AMPULE: 2.5; .5 SOLUTION RESPIRATORY (INHALATION) at 17:26

## 2019-01-01 RX ADMIN — SODIUM CHLORIDE, PRESERVATIVE FREE 10 ML: 5 INJECTION INTRAVENOUS at 07:58

## 2019-01-01 RX ADMIN — IPRATROPIUM BROMIDE AND ALBUTEROL SULFATE 1 AMPULE: 2.5; .5 SOLUTION RESPIRATORY (INHALATION) at 08:21

## 2019-01-01 RX ADMIN — SODIUM CHLORIDE, PRESERVATIVE FREE 10 ML: 5 INJECTION INTRAVENOUS at 20:56

## 2019-01-01 RX ADMIN — SODIUM CHLORIDE, PRESERVATIVE FREE 10 ML: 5 INJECTION INTRAVENOUS at 07:57

## 2019-01-01 RX ADMIN — PANTOPRAZOLE SODIUM 40 MG: 40 INJECTION, POWDER, FOR SOLUTION INTRAVENOUS at 11:01

## 2019-01-01 RX ADMIN — PANTOPRAZOLE SODIUM 40 MG: 40 INJECTION, POWDER, FOR SOLUTION INTRAVENOUS at 07:57

## 2019-01-01 RX ADMIN — SODIUM CHLORIDE, POTASSIUM CHLORIDE, SODIUM LACTATE AND CALCIUM CHLORIDE: 600; 310; 30; 20 INJECTION, SOLUTION INTRAVENOUS at 14:19

## 2019-01-01 RX ADMIN — IPRATROPIUM BROMIDE AND ALBUTEROL SULFATE 1 AMPULE: 2.5; .5 SOLUTION RESPIRATORY (INHALATION) at 21:33

## 2019-01-01 RX ADMIN — IPRATROPIUM BROMIDE AND ALBUTEROL SULFATE 1 AMPULE: 2.5; .5 SOLUTION RESPIRATORY (INHALATION) at 16:10

## 2019-01-01 RX ADMIN — METOPROLOL TARTRATE 2.5 MG: 5 INJECTION INTRAVENOUS at 15:53

## 2019-01-01 RX ADMIN — SODIUM CHLORIDE, PRESERVATIVE FREE 10 ML: 5 INJECTION INTRAVENOUS at 11:02

## 2019-01-01 RX ADMIN — SODIUM CHLORIDE, PRESERVATIVE FREE 10 ML: 5 INJECTION INTRAVENOUS at 19:57

## 2019-01-01 RX ADMIN — IPRATROPIUM BROMIDE AND ALBUTEROL SULFATE 1 AMPULE: 2.5; .5 SOLUTION RESPIRATORY (INHALATION) at 12:30

## 2019-01-01 RX ADMIN — METOPROLOL TARTRATE 25 MG: 25 TABLET ORAL at 20:54

## 2019-01-01 RX ADMIN — FUROSEMIDE 20 MG: 10 INJECTION, SOLUTION INTRAMUSCULAR; INTRAVENOUS at 15:34

## 2019-01-01 RX ADMIN — PANTOPRAZOLE SODIUM 40 MG: 40 INJECTION, POWDER, FOR SOLUTION INTRAVENOUS at 09:17

## 2019-01-01 RX ADMIN — PANTOPRAZOLE SODIUM 40 MG: 40 INJECTION, POWDER, FOR SOLUTION INTRAVENOUS at 19:51

## 2019-01-01 RX ADMIN — METOPROLOL TARTRATE 50 MG: 50 TABLET, FILM COATED ORAL at 20:45

## 2019-01-01 ASSESSMENT — PAIN SCALES - GENERAL
PAINLEVEL_OUTOF10: 0
PAINLEVEL_OUTOF10: 3
PAINLEVEL_OUTOF10: 0

## 2019-01-01 ASSESSMENT — PULMONARY FUNCTION TESTS
PIF_VALUE: 0
PIF_VALUE: 1
PIF_VALUE: 0
PIF_VALUE: 1
PIF_VALUE: 0
PIF_VALUE: 1
PIF_VALUE: 0

## 2019-01-01 ASSESSMENT — ENCOUNTER SYMPTOMS
DIARRHEA: 0
NAUSEA: 0
SINUS PRESSURE: 0
SPUTUM PRODUCTION: 0
ABDOMINAL PAIN: 0
PHOTOPHOBIA: 0
BACK PAIN: 0
SHORTNESS OF BREATH: 1
WHEEZING: 0
VOMITING: 0
SORE THROAT: 0
COUGH: 0

## 2019-01-02 ENCOUNTER — HOSPITAL ENCOUNTER (OUTPATIENT)
Dept: OCCUPATIONAL THERAPY | Age: 69
Setting detail: THERAPIES SERIES
Discharge: HOME | End: 2019-01-02
Payer: MEDICARE

## 2019-01-02 PROCEDURE — 97530 THERAPEUTIC ACTIVITIES: CPT | Performed by: OCCUPATIONAL THERAPIST

## 2019-05-07 NOTE — DISCHARGE SUMMARY
Patient will demonstrate compliance with compression therapy for independent management of lymphedema. Patient goal partially met          LT weeks  1.  Patient will be independent with self management of lymphedema including understanding garment wear schedule, self compression bandaging, HEP and self care. Patient goal partially met  2.  Patient will be fitted for appropriate compression garments for long term management of lymphedema. Patient goal partially met  3.  Patient will present with decreased limb volume in the involved extremity from mild to trace  for improved functional mobility  Goal not met      Frequency/Duration:  # Days per week:  2-3x/wk # Weeks: 6     Rehab Potential: [] Excellent [] Good [x] Fair  [] Poor     Goal Status:  [] Achieved [x] Partially Achieved  [] Not Achieved     Patient Status: [] Continue per initial plan of Care     [x] Patient now discharged     [] Additional visits requested, Please re-certify for additional visits:          Electronically signed by: JESICA Cantu/L, PAOLA    If you have any questions or concerns, please don't hesitate to call.   Thank you for your referral.    Physician Signature:________________________________Date:__________________  By signing above, therapists plan is approved by physician

## 2019-12-27 PROBLEM — I26.99 BILATERAL PULMONARY EMBOLISM (HCC): Status: ACTIVE | Noted: 2019-01-01

## 2019-12-27 NOTE — CONSULTS
[facility]  Internal Medicine Residency Program  History and Physical  MICU    Patient:  No Sharma 77 y.o. male MRN: [id number]     Date of Service: 12/27/2019    Hospital Day: 1      Chief complaint: Shortness of breath  History of Present Illness   The patient is a 77 y.o. 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 Patient was evaluated at outside hospital and neurosurgery(Dr. Solis) evaluated the patient and states that due to his metastatic disease to his brain with vasogenic edema patient is not a candidate for anticoagulation of any type. This was discussed with the patient. Decision to place IVC filter after discussing with the patient. Patient states that he is been noticing swelling in his bilateral lower extremities as well as swelling in his left upper extremity for the past 3 days. The swelling has been getting worse in the bilateral lower extremities but just began in his left upper extremity. Patient denies any recent travel, denies any recent immobility or inactivity. Patient denies any hemoptysis, abdominal pain, nausea, vomiting, diarrhea, syncope, blurry vision.   Patient states that he does have shortness of breath as well as tachycardia but does not feel fluttering in his chest.  Patient does have a history of paroxysmal atrial fibrillation but does not use any type of anticoagulation nor does he use any rate or rhythm control due to his history of metastatic melanoma at this point in time. This is discussed with his cardiologist as an outpatient and they came to this decision. Patient is amenable to IVC filter placement as well as echocardiogram to evaluate for heart strain. No heart strain seen on the patient's CTA chest.  Patient's wife was in the room with him during this conversation. Case was discussed with vascular surgery. Past Medical History:      Diagnosis Date    A-fib [facility])     CAD (coronary artery disease) 2008    MI, STENTS. follows with Dr Amol Quezada [facility]) 2014    CA COLON    Encounter for aspirin therapy 2008    POST ANGIOPLASTY WITH STENTS    Hyperlipidemia     Melanoma of thigh, right ([facility] [address] 03/2018    Neuropathy     LEFT ULNAR NERVE/right also    Preoperative clearance 5-22-14    Cardiac clearance on paper chart from Dr. Lita Art       Past Surgical History:        Procedure Laterality Date    ABDOMEN SURGERY  2/25/14    COLON RESECTION    BACK SURGERY      x2    COLONOSCOPY      CORONARY ANGIOPLASTY WITH STENT PLACEMENT  11/2008    HERNIA REPAIR  3/20/2015    open,removal mesh,implant bio mesh    LEG SURGERY Right     CA excision     MALIGNANT SKIN LESION EXCISION  03/12/2018    melanoma    OTHER SURGICAL HISTORY Left 5/23/2014    left ulnar nerve decompression    OTHER SURGICAL HISTORY Left 03/30/2018    port insertion    TN INSJ TUNNELED CTR VAD W/SUBQ PORT AGE 5 YR/> N/A 3/30/2018    POWER PORT INSERTION (.THIS START TIME) performed by Jennie Jiménez MD at [facility] TUNNELED VENOUS PORT PLACEMENT         Medications Prior to Admission:    Prior to Admission medications    Medication Sig Start Date End Date Taking?  Authorizing Provider   sodium chloride 0.9 % SOLN with methotrexate PF 25 MG/ML SOLN INJECT 12.5 MG INTRAMUSCULAR AT THE DOCTORS OFFICE WEEKLY 9/27/19   Historical Provider, MD   dexamethasone (DECADRON) 2 MG tablet Take 4 mg (two tablets) twice daily through 11/3/19  Take 2 mg three times daily through 11/10/19  Take 2 mg twice daily through 11/17/19  Take 2 mg once daily through 11/24/19  STOP 10/30/19   Historical Provider, MD   Apremilast (OTEZLA) 30 MG TABS Take by mouth    Historical Provider, MD   Naproxen Sodium (ALEVE PO) Take by mouth    Historical Provider, MD   docusate sodium (COLACE) 100 MG capsule Take 100 mg by mouth daily     Historical Provider, MD   aspirin 325 MG tablet Take 325 mg by mouth daily Prescribed per cardiology 2005, stent; pt states instructed to hold 7 days preop per Dr Justin Smith. Historical Provider, MD       Allergies:  Patient has no known allergies. Social History:   TOBACCO:   reports that he quit smoking about 9 years ago. His smoking use included cigarettes. He has a 40.00 pack-year smoking history. He has never used smokeless tobacco.  ETOH:   reports current alcohol use. Family History:       Problem Relation Age of Onset   Ferreira Cancer Mother     Other Father         embolism       REVIEW OF SYSTEMS:    · Constitutional: No fever, no chills, no change in weight; good appetite  · HEENT: No blurred vision, no ear problems, no sore throat, no rhinorrhea. · Respiratory: No cough, no sputum production, no pleuritic chest pain, shortness of breath  · Cardiology: No angina, no dyspnea on exertion, no paroxysmal nocturnal dyspnea, no orthopnea, no palpitation, no leg swelling. · Gastroenterology: No dysphagia, no reflux; no abdominal pain, no nausea or vomiting; no constipation or diarrhea.  No hematochezia   · Genitourinary: No dysuria, no frequency, hesitancy; no hematuria  · Musculoskeletal: no joint pain, no myalgia, no change in range of movement  · Neurology: no focal weakness in extremities, no slurred speech, no double vision, no tingling or numbness sensation  · Endocrinology: no temperature intolerance, no polyphagia, polydipsia or polyuria  · Hematology: no increased bleeding, no bruising, no lymphadenopathy  · Skin: no skin changes noticed by patient  · Psychology: no depressed mood, no suicidal ideation    Physical Exam   · Vitals: BP (!) 156/117   Pulse 99   Temp 97.9 °F (36.6 °C) (Axillary)   Resp 22   Ht 5' 10\" (1.778 m)   Wt (!) 325 lb (147.4 kg)   SpO2 97%   BMI 46.63 kg/m²   ·    ·      · General Appearance: alert and oriented to person, place and time, well developed and well- nourished, in no acute distress  · Skin: warm and dry, no rash or erythema  · Head: normocephalic and atraumatic  · Eyes: pupils equal, round, and reactive to light, extraocular eye movements intact, conjunctivae normal  · Neck: supple and non-tender without mass, no thyromegaly or thyroid nodules, no cervical lymphadenopathy  · Pulmonary/Chest: clear to auscultation bilaterally- no wheezes, rales or rhonchi, normal air movement, no respiratory distress  · Cardiovascular: normal rate, regular rhythm, normal S1 and S2, no murmurs, rubs, clicks, or gallops, distal pulses intact, no carotid bruits  · Abdomen: soft, non-tender, non-distended, normal bowel sounds, no masses or organomegaly, morbidly obese distended abdomen  · Extremities: no cyanosis, clubbing; 4+ pitting edema in bilateral lower extremities from toes to hips, 1+ pitting edema and left upper extremity from fingertips to mid forearm  · Musculoskeletal: normal range of motion, no joint swelling, deformity or tenderness  · Neurologic: reflexes normal and symmetric, no cranial nerve deficit,coordination and speech normal, unsteady gait    Lines     site day    Art line   None    TLC None    PICC None    Hemoaccess None    Oxygen:     Room air     ABG:   No results found for: PHART, PH, KXS6XJL, PCO2, PO2ART, PO2, JWP5JNG, HCO3, BEART, BE, THGBART, THB, LGI3KPU, U9JYIREA, O2SAT  Labs and Imaging Studies   Basic Labs  CBC:   Lab Results   Component Value Date    WBC 6.4 12/28/2019    RBC 3.97 12/28/2019    HGB 12.1 12/28/2019    HCT 38.5 12/28/2019    MCV 97.0 12/28/2019    RDW 16.2 12/28/2019     9:15 AM EXAM: CTA CHEST W CONTRAST number of images 545 including MIP coronal and sagittal reconstruction images. Contrast. Isovue-370, 100 mL intravenously. Technique: Low-dose CT  acquisition technique included one of following options; 1 . Automated exposure control, 2. Adjustment of MA and or KV according to patient's size or 3. Use of iterative reconstruction. INDICATION:  r/o PE, hx of cancer, SOB/weak/lightheaded r/o PE, hx of cancer, SOB/weak/lightheaded COMPARISON: None FINDINGS: There is borderline cardiac size. There is aneurysm of the ascending thoracic aorta measuring 4.3 x 4.3 cm. There is also mild aneurysm of the abdominal aorta measuring 3.9 cm. There is coronary artery calcification. Small to moderate lymph nodes are identified in the mediastinum with lymph nodes measuring up to 1.2 cm in the AP window and similar 1's in the prevascular space and smaller 1's in the paratracheal region. There are filling defects in the right upper lobe and right lower lobe pulmonary arteries and small filling defect in the segmental branches of left upper lobe concerning for bilateral pulmonary embolism. There is minimal atelectasis/infiltrates in the lung bases bilaterally more on the right side. Some pleural-based nodular density in the left costophrenic angle is noted. The liver is of normal architecture. Gallbladder is distended. Degenerative changes are identified in the thoracic spine. Bilateral pulmonary embolism with involvement of the right upper lobe and right lower lobe and segmental branches of left upper lobe. Minimal atelectasis/infiltrates and pleural effusions in the lung bases. Aortic aneurysm with involvement of the ascending thoracic aorta and the abdominal aorta. Surveillance is recommended. The above findings were telephoned to the ED physician.  ALERT:  THIS IS AN ABNORMAL REPORT     Us Dup Lower Extremity Left Fahad    Result Date: 2019  Patient MRN:  [id number] : 1950 Age: 77 years Gender: Male Order Date:  12/27/2019 9:15 AM EXAM: US DUP LOWER EXTREMITY LEFT GUILLERMO NUMBER OF IMAGES:  28 INDICATION:  dvt dvt COMPARISON: None There is no evidence for deep venous thrombosis There is good compressibility, there is good augmentation, there is good color flow. No evidence for deep venous thrombosis       EKG:atrial fibrillation, rate 100. Resident's Assessment and Plan     Assessment  1. Submassive pulmonary embolism  2. Atrial fibrillation with controlled rate  3.  CAD/STEMI status post stent placement  4. Hyperlipidemia  5. Metastatic melanoma the skin with metastasis to the brain and lymph nodes (stage IV, status post gamma knife therapy Keytruda and Opdivo therapy)    Plan  1. No anticoagulation due to a metastasis of melanoma to brain, IVC filter to be placed by vascular surgery, input appreciated, patient amenable to this intervention, ultrasound bilateral upper and lower extremities, bilateral lower extremities negative for DVT, echocardiogram for patient to evaluate for right heart strain  2.   Continue holding all forms of anticoagulation due to brain metastasis, hematology oncology and palliative medicine consulted for further evaluation    PT/OT evaluation: none   DVT prophylaxis/ GI prophylaxis: none/protonix  Disposition: MICU    Carmela Ruiz DO, PGY-2   Attending physician: Dr. Cris Colon

## 2019-12-28 NOTE — PLAN OF CARE

## 2019-12-28 NOTE — PROGRESS NOTES
Neurosurgery  Has left frontal malignant melanoma met s/p gamma knife now with PE  Full anticoagulation contraindicated  No neurosurgical intervention planned  Will follow  Counseled patient and family extensively and answered all questions

## 2019-12-28 NOTE — PROGRESS NOTES
Residents. Additionally comprehensive, multidisciplinary rounds were conducted with the MICU team. The case was discussed in detail and plans for care were established. Review of Residents documentation was conducted and revisions were made as appropriate. I agree with the above documented exam, problem list and plan of care. For IVC filter today lower extremity ultrasound negative for clot. Can have echo done as outpatient. OK for discharge from Pulm/CCM standpoint after filter.   Regine Beck D.O.  CCT 36 min

## 2019-12-28 NOTE — PROGRESS NOTES
[facility] Quality Flow/Interdisciplinary Rounds Progress Note        Quality Flow Rounds held on December 28, 2019    Disciplines Attending:  Bedside Nurse, Charge nurse, GLADYS, OT, PT, , and . Jimmie Meyer was admitted on 12/27/2019  5:25 PM    Anticipated Discharge Date:  Expected Discharge Date: 01/17/20    Disposition:    Riky Score:  Riky Scale Score: 19    Readmission Risk              Risk of Unplanned Readmission:        8           Discussed patient goal for the day, patient clinical progression, and barriers to discharge.   The following Goal(s) of the Day/Commitment(s) have been identified:  Patient for IVC filter today      [facility]  December 28, 2019

## 2019-12-28 NOTE — PROGRESS NOTES
Marta Brookfreddie was ordered apremilast Gearldean Officer) 30 mg which is a nonformulary medication. The patient has indicated that the home supply of this medication will be brought in to the hospital for inpatient use. If the medication has not been administered by 1400 on the following day from the time the order was placed, a pharmacist will follow-up with the nurse of the patient to assess the capability of the patient to bring in the medication. If it is determined that the patient cannot supply the medication and it is not available to be dispensed from the pharmacy, a call will be placed to the ordering provider to discuss alternative options.

## 2019-12-28 NOTE — CONSULTS
Vascular Surgery Consultation Note    Reason for Consult:  IVC Filter placement     HPI:    This is a 77 y.o. male who is admitted to the hospital for treatment of new onset shortness of breath. Patient has a robust past medical and surgical history. Patient has a history of metastatic melanoma for which he is receiving treatment at [facility], per discussions with patient he states that he has had gamma knife radiation as the metastasis are to the brain. He has had metastatic melanoma for approximately 1 year and is on Keytruda. He states he has been having shortness of breath and leg swelling that has been progressive over the past month. He has had worsening swelling of the bilateral lower extremities. He had a CTA which demonstrated bilateral pulmonary embolism with clear metastatic disease to the head, and per discussions with patient nursing and review of documentation neurosurgery stated patient is not a candidate for anticoagulation. As such vascular surgery was consulted for the evaluation and treatment of placement of IVC filter. Patient is on aspirin and no other [address] at home. He does have a past surgical history of left hemicolectomy in 2015 for T1 adenocarcinoma. He had subsequent hernia and hernia repairs. He is also had an excision of right thigh melanoma with a right inguinal lymphadenectomy.       ROS: Negative if blank [], Positive if [x]  General Vascular   [] Fevers [] Claudication (Blocks)   [] Chills [] Rest Pain   [] Weight Loss [] Tissue Loss   [] Chest Pain [] Clotting Disorder   [x] SOB at rest [x] Leg Swelling   [x] SOB with exertion [] DVT/PE      [] Nausea    [] Vomiting [] Stroke/TIA   [] Abdominal Pain [] Focal weakness   [] Melena [] Slurred Speech   [] Hematochezia [] Vision Changes   [] Hematuria    [] Dysuria [] Hx of Central Catheters   [x] Wears Glasses/Contacts [] Dialysis and If so date initiated   [] Blindness    [x] Right Hand Dominant   [] Difficulty of breathing  CARDIOVASCULAR:  regular rate and rhythm  ABDOMEN:  soft, non-distended, non-tender, Aorta is not palpable, midline scar noted    EXTREMITIES:    R UE Swelling absent Incisions absent       5/5 Strength    L UE Swelling absent Incisions absent       5/5 Strength    R LE Edema present     Incisions present- right thigh and right groin, well healed    Varicose veins absent    Wounds absent    normalcaprefill   5/5 Strength   Neuropathy is absent    L LE Edema present     Incisions absent    Varicose veins absent    Wounds absent    normalcaprefill   5/5 Strength   Neuropathy is absent    R radial 2+ L radial 2+   R femoral 1+ L femoral 1+   R dorsalis pedis 1+ L dorsalis pedis 1+       LABS:    Lab Results   Component Value Date    WBC 7.4 2019    HGB 12.6 2019    HCT 40.7 2019     2019    PROTIME 12.0 2019    INR 1.0 2019    K 3.7 2019    BUN 10 2019    CREATININE 0.8 2019       RADIOLOGY:  Ct Head W Wo Contrast    Result Date: 2019  Patient MRN:  [id number] : 1950 Age: 77 years Gender: Male Order Date:  2019 11:30 AM EXAM: CT HEAD W WO CONTRAST NUMBER OF IMAGES:  299 INDICATION:  increased weakness in legs, dizziness, lightheadedness increased weakness in legs, dizziness, lightheadedness COMPARISON: None Technique: Low-dose CT  acquisition technique included one of following options; 1 . Automated exposure control, 2. Adjustment of MA and or KV according to patient's size or 3. Use of iterative reconstruction. Multiple CT sections were obtained with sagittal and coronal MPR reconstructions. The ventricles are prominent. The gyri and sulci appear  prominent. The white matter appears  prominent. There is no evidence for hemorrhage. There is no infarct identified. There is mass effect in the right frontal lobe following administration contrast there is an enhancing mass. The mass measures 2.6 x 1.8 cm.      Diffuse atrophy are identified in the thoracic spine. Bilateral pulmonary embolism with involvement of the right upper lobe and right lower lobe and segmental branches of left upper lobe. Minimal atelectasis/infiltrates and pleural effusions in the lung bases. Aortic aneurysm with involvement of the ascending thoracic aorta and the abdominal aorta. Surveillance is recommended. The above findings were telephoned to the ED physician. ALERT:  THIS IS AN ABNORMAL REPORT     Us Dup Lower Extremity Left Guillermo    Result Date: 2019  Patient MRN:  [id number] : 1950 Age: 77 years Gender: Male Order Date:  2019 9:15 AM EXAM: US DUP LOWER EXTREMITY LEFT GUILLERMO NUMBER OF IMAGES:  28 INDICATION:  dvt dvt COMPARISON: None There is no evidence for deep venous thrombosis There is good compressibility, there is good augmentation, there is good color flow. No evidence for deep venous thrombosis         Assesment/Plan  77 y.o. male with metastatic melanoma and new onset bilateral pulmonary embolism without evidence of DVT, however patient is not a candidate for anticoagulation and as such we will place a IVC filter.   Risk benefits and alternatives including but not limited to bleeding, infection, hematoma, damage to surrounding structures including artery, reaction anesthesia including hypoxia and death were discussed with the patient and he agrees to proceed  Discussed with Dr. Allen Staples MD  19  1:06 AM

## 2019-12-28 NOTE — PROGRESS NOTES
Patient known to our service with metastatic melanoma with frontal lobe brain mets status post gamma knife on immunological therapy unfortunately admitted now with pulmonary embolism. Going on for a filter. Certainly there is always some debate about whether you can anticoagulate somebody with metastatic disease to the brain. The lesion itself is relatively large and necrotic appearing in nature plus affecting a melanoma it would be best to proceed forward with the filter. No egg anticoagulation to be offered. We will follow-up as an outpatient.

## 2019-12-28 NOTE — H&P
[address]                  [address] [facility],  [facility]                              HISTORY AND PHYSICAL    PATIENT NAME: Bren Mayers                    :        1950  MED REC NO:   [id number]                            ROOM:       [id number]  ACCOUNT NO:   [de-identified]                           ADMIT DATE: 2019  PROVIDER:     Lul Pulliam DO    CHIEF COMPLAINT/REASON FOR THIS HOSPITAL ADMISSION:  Bilateral pulmonary  emboli. HISTORY OF PRESENT ILLNESS:  The patient is a 61-year-old unfortunate  male who was admitted to this hospital facility after he presented to  the emergency room with shortness of breath. The patient has a history  of metastatic malignant melanoma for which he is receiving treatment at  [facility].  The patient has had Gamma Knife radiation as he has  metastasis to his brain as well. The patient had treatment for  approximately one year and is currently on Keytruda. He has been having  shortness of breath and leg swelling over the last several days. He had  a CTA done of the chest upon arrival which revealed the presence of  bilateral pulmonary emboli with clear metastatic disease to his head. Vascular Surgery was consulted for evaluation of possible placement of  IVC filter. The patient prior to this admission was on aspirin therapy,  but no oral anticoagulation at home. PAST MEDICAL HISTORY:  Significant for left hemicolectomy in 2015 for T1  adenocarcinoma. RECENT AND PRESENT MEDICATIONS:  Noted. PAST SURGICAL HISTORY:  Consistent with prior colon resection, coronary  artery angioplasty with stent placement. SOCIAL HISTORY:  . Resides at home with his wife. No use of  illicit drugs or alcohol. FAMILY HISTORY:  Negative with regards to above chief complaint. ALLERGIES:  None known. REVIEW OF SYSTEMS:  Remainder of systems is otherwise negative.     PHYSICAL ASSESSMENT:  GENERAL:  Reveals a 78-year-old male in no acute distress. VITAL SIGNS:  /117, pulse rate of 99, temperature of 97.9,  respiratory rate of 22. ENT:  Negative. HEART:  Regular. LUNGS:  Clear. ABDOMEN:  Noted to be soft with positive bowel sounds. EXTREMITIES:  Reveal no evidence for edema and/or cyanosis. ASSESSMENT:  1.  Submassive bilateral pulmonary embolism. 2.  Chronic atrial fibrillation with controlled ventricular response. 3.  Coronary artery disease with history of stent placement. 4.  Hyperlipidemia. 5.  History of metastatic malignant melanoma of the skin with metastasis  to the brain and lymph node stage IV, status post Gamma Knife therapy  with Keytruda and Opdivo therapy. PLAN OF TREATMENT:  No anticoagulation due to metastasis to the brain. IVC filter with vascular consultation requested. Hematology/Oncology  and Palliative Medicine will be consulted.         Shana Wesley DO    D: 12/28/2019 8:23:48       T: 12/28/2019 8:34:36     [id number]  Job#: [id number]     Doc#: [id number]    CC:

## 2019-12-28 NOTE — OP NOTE
DATE OF OPERATION:    12/28/2019    PREOPERATIVE DIAGNOSIS:    Deep venous thrombosis, Pulmonary Embolus    POSTOPERATIVE DIAGNOSIS:    Same    SURGEON:    Arletta Cowden, M.D.    ASSISTANT(S):    Lynette Roach MD PGY 5    ANESTHESIA:    [facility]    OPERATION:    Insertion of inferior vena cava filter (14481)    ESTIMATED BLOOD LOSS:    Minimal    COMPLICATIONS:    None    DESCRIPTION OF OPERATION:    The patient was identified and the procedure confirmed. The groin regions were prepped and draped in the usual sterile fashion. Lidocaine 1% mixed with marcaine 0.25% were used for local anesthesia. The left common femoral vein was percutaneously entered and a wire was advanced into the inferior vena cava under fluoroscopic guidance. A small incision was made around the wire. The dilator was passed over the wire followed by the introducer, which was advanced into the inferior vena cava. The filter Danuta Curl) was advanced through the introducer then positioned and deployed at the level of the 2rd vertebra. The filter deployed properly. The introducer was removed and pressure was held for hemostasis. A sterile pressure bandage was applied to the puncture site in the operating room. Needle, sponge, and instrument counts were reported as correct. The patient tolerated the procedure and was transferred to the SICU in satisfactory condition. Dr. Nita Dillard was present for the procedure.      Lynette Roach MD  General Surgery, PGY-5

## 2019-12-28 NOTE — CONSULTS
Palliative Medicine   Consult Note    Provider: Erwin SINGER- CNS, [facility] Day: 2    Referring Provider: Dr. Alix Ornelas     Reason for Consult:  ____Advanced Care Planning  __x__Assist with goals of care  __x__Code status  __x__Pt/family support  ____Symptom Management      Recommendations:  1. Psychosocial support of patient and family: met with wife at bedside; Wife Kim Henry @ [phone]; support offered  2. Assist with goals of care: continue current care; plans for IVC filter placement today; wishes to continue with current treatment at the [facility]  3. Advance Care Planning: none in place; wife is NOK  4. 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   5. Code Status: full - wishes to continue; would not want long term life support  6. Symptom Management - per ICU team      Chief complaint: Bilateral PE/SOB    HPI:   Nusrat Polk is a 77 y.o. 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 Pt was diagnosed with Metastatic Melanoma approximately 1 1/2 yrs ago; had gamma knife therapy at [facility]; receives Skeet Tay treatment through the [facility] with Dr. Mike Camara. The patient also completed radiation therapy at [facility] in October, he states that he had 3 radiation treatments completed there. Wife reported the patient had worsening shortness of breath and leg swelling over the past month; difficulty ambulating due to increased swelling in his legs and generalized weakness. The patient has been taking Decadron daily.  CTA chest show pleural based nodular density at the left lung base along with aspirin therapy     POST ANGIOPLASTY WITH STENTS    Hyperlipidemia     Melanoma of thigh, right ([address] 2018    Neuropathy     LEFT ULNAR NERVE/right also    Preoperative clearance 14    Cardiac clearance on paper chart from Dr. Cara Ordonez       Past Surgical History:   Procedure Laterality Date    ABDOMEN SURGERY  14    COLON RESECTION    BACK SURGERY      x2    COLONOSCOPY      CORONARY ANGIOPLASTY WITH STENT PLACEMENT  2008    HERNIA REPAIR  3/20/2015    open,removal mesh,implant bio mesh    LEG SURGERY Right     CA excision     MALIGNANT SKIN LESION EXCISION  2018    melanoma    OTHER SURGICAL HISTORY Left 2014    left ulnar nerve decompression    OTHER SURGICAL HISTORY Left 2018    port insertion    ID INSJ TUNNELED CTR VAD W/SUBQ PORT AGE 5 YR/> N/A 3/30/2018    POWER PORT INSERTION (.THIS START TIME) performed by Yissel Martell MD at [address] TUNNELED VENOUS PORT PLACEMENT           Allergies:    Patient has no known allergies.       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    Social Needs    Financial resource strain: None    Food insecurity:     Worry: None     Inability: None    Transportation needs:     Medical: None     Non-medical: None   Tobacco Use    Smoking status: Former Smoker     Packs/day: 1.00     Years: 40.00     Pack years: 40.00     Types: Cigarettes     Last attempt to quit: 3/21/2010     Years since quittin.7    Smokeless tobacco: Never Used   Substance and Sexual Activity    Alcohol use: Yes     Comment: socially    Drug use: No    Sexual activity: Yes   Lifestyle    Physical activity:     Days per week: None     Minutes per session: None    Stress: None   Relationships    Social connections:     Talks on phone: None     Gets together: None     Attends Mu-ism service: None     Active member of club or organization: None     Attends meetings of clubs or organizations: None     Relationship status: None    Intimate partner violence:     Fear of current or ex partner: None     Emotionally abused: None     Physically abused: None     Forced sexual activity: None   Other Topics Concern    None   Social History Narrative    None         Family history:  Family History   Problem Relation Age of Onset    Cancer Mother     Other Father         embolism       ----- REVIEW OF SYSTEMS -----  CONSTITUTIONAL: Denies fever, chill or rigors, nausea or vomiting. HEENT: denies blurring of vision or double vision, denies hearing problem   RESPIRATORY: denies cough,+ shortness of breath,- sputum expectoration, chest pain. CARDIOVASCULAR: Denies palpitation   GASTROINTESTINAL: Denies abdominal pain, diarrhea or constipation. GENITOURINARY: Denies burning urination or frequency of urination   INTEGUMENT: denies wound, rash   HEMATOLOGIC/LYMPHATIC: Denies lymph node swelling, gum bleeding or easy bruising.    MUSCULOSKELETAL: + leg pain,+significant LE swelling  NEUROLOGICAL: Denies light headed, dizziness, loss of consciousness, +weakness      Vitals:   Vitals:    12/28/19 0600 12/28/19 0601 12/28/19 0700 12/28/19 0800   BP: (!) 147/114 (!) 147/114 (!) 134/111 (!) 142/100   Pulse: 109  100 104   Resp: 20  19 21   Temp:    98.2 °F (36.8 °C)   TempSrc:    Temporal   SpO2: 92%  91% 96%   Weight:  (!) 329 lb (149.2 kg)     Height:           Physical Exam:    Gen:  Obese, Alert, awake, following commands, in no acute distress  HEENT: Pupils equal and round; reactive to light  Neck: Supple  Lungs: CTA bilaterally; no audible rhonchi or wheezes noted  Heart: A-fib/RVR; irregular;  no murmur, rub, or gallop noted during exam  Abd: Soft, non tender, non distended, BS+  Ext: Moving all extremities, ++ edema, pulses present  Skin: Warm and dry  Neuro: Alert, oriented x 3; following commands    Imagin/27 CT Head:   Diffuse atrophy likely age related   Findings compatible with small vessel ischemic changes. Mass in the left frontal lobe, the differential would include   metastatic disease, versus primary neoplasm such as a glioma. Given   the patient's history of melanoma, metastatic melanoma could give this   appearance          CTA Chest:   Bilateral pulmonary embolism with involvement of the right upper lobe   and right lower lobe and segmental branches of left upper lobe.       Minimal atelectasis/infiltrates and pleural effusions in the lung   bases.       Aortic aneurysm with involvement of the ascending thoracic aorta and   the abdominal aorta. Surveillance is recommended.       The above findings were telephoned to the ED physician.             Impression  Active Problems:    Bilateral pulmonary embolism ([address]  Resolved Problems:    * No resolved hospital problems. *      Assessment:   Tomas Mccullough is a 77 y.o. male with significant past medical history of  Melanoma (on Keytruda) presented with worsening shortness of breath, increased leg swelling, lightheadedness, and fatigue for ~1 month found with bilateral PE and brain met in left frontal lobe (known to pt); has had gamma knife in the past at that site. Neurosurgery was consulted- started on keppra; recommendation for no anticoagulation; plan for IVC filter today. Continue current care. Plan:   1. Submassive pulmonary embolism-Vascular consulted; plan for IVC filter placement today; no anticoagulation  2. Hx of Melanoma with brain mets (s/p gamma knife/keytruda/optivo)- Neurosurgery and Hem/Onc consulted; plan to continue with keytruda through the [address] [place] [address]  3. A-fib- monitor; rate control  4. Family support - met with wife, Cee Rust,  at bedside; support offered  5.  GOALS- continue current care; continue to seek treatment for melanoma; hopes to return

## 2019-12-29 NOTE — PROGRESS NOTES
[facility]   Department of Internal Medicine   Internal Medicine Residency  MICU Progress Note    Patient:  Erwin Baldwin 77 y.o. male   MRN: [id number]       Date of Service: 12/29/2019    Allergy: Patient has no known allergies. Subjective     Seen and examined, has no new complaints today. No acute complaints this AM except for slight feeling of fever; vitals are stable; patient saturating well on nasal canula; discussed case with wife and all questions answered; discussed starting lopressor with family for afib and they are amenable     Objective     Vitals:    12/29/19 0700 12/29/19 0800 12/29/19 0900 12/29/19 1000   BP: (!) 144/117 (!) 159/105  129/89   Pulse: 109 124 119 114   Resp: 17 27 18 26   Temp:  98.2 °F (36.8 °C)     TempSrc:  Oral     SpO2: (!) 89% 92% 93%    Weight:       Height:           Physical Exam:  I & O - 24hr:I/O this shift:  In: 360 [P.O.:360]  · Out: 325 [Urine:325]   Physical Exam  Constitutional:       General: He is not in acute distress. Appearance: Normal appearance. He is well-developed. He is obese. He is not diaphoretic. HENT:      Head: Normocephalic and atraumatic. Eyes:      General: No scleral icterus. Conjunctiva/sclera: Conjunctivae normal.      Pupils: Pupils are equal, round, and reactive to light. Cardiovascular:      Rate and Rhythm: Tachycardia present. Rhythm irregular. Heart sounds: Normal heart sounds, S1 normal and S2 normal. No murmur. Pulmonary:      Effort: Pulmonary effort is normal. No respiratory distress. Breath sounds: Normal breath sounds. No wheezing or rales. Abdominal:      General: Bowel sounds are normal. There is no distension. Palpations: Abdomen is soft. There is no mass. Tenderness: There is no tenderness. There is no guarding. Musculoskeletal:         General: Swelling present. No tenderness. Right lower leg: Edema present. Left lower leg: Edema present.    Skin:     General: Skin is warm and dry. Neurological:      Mental Status: He is alert and oriented to person, place, and time. Cranial Nerves: No cranial nerve deficit.        Edema 4+ in BL LE but improving       Medications     Continuous Infusions:   dextrose       Scheduled Meds:   metoprolol  2.5 mg Intravenous Once    metoprolol tartrate  25 mg Oral BID    sodium chloride flush  10 mL Intravenous 2 times per day    pantoprazole  40 mg Intravenous Daily    And    sodium chloride (PF)  10 mL Intravenous Daily    ipratropium-albuterol  1 ampule Inhalation 4x daily    Apremilast  30 mg Oral Daily    docusate sodium  100 mg Oral Daily     PRN Meds: sodium chloride flush, magnesium hydroxide, ondansetron, acetaminophen, polyethylene glycol, glucose, dextrose, glucagon (rDNA), dextrose  Nutrition:       Labs and Imaging Studies     CBC:   Recent Labs     12/27/19 2000 12/28/19  0545 12/29/19  0619   WBC 7.4 6.4 6.9   RBC 4.19 3.97 3.89   HGB 12.6 12.1* 11.7*   HCT 40.7 38.5 38.1   MCV 97.1 97.0 97.9   MCH 30.1 30.5 30.1   MCHC 31.0* 31.4* 30.7*   RDW 16.1* 16.2* 16.1*    206 197   MPV 8.6 8.8 8.6       BMP:    Recent Labs     12/27/19  0935 12/27/19 2000 12/28/19  0545 12/29/19  0619    143 140 137   K 3.7 3.7 3.6 3.7    104 103 101   CO2 24 25 24 22   BUN 13 10 10 14   CREATININE 0.9 0.8 0.9 1.0   GLUCOSE 107* 89 96 100*   CALCIUM 8.3* 8.5* 8.3* 7.9*   PROT 5.5*  --   --   --    LABALBU 3.3*  --   --   --    BILITOT 0.4  --   --   --    ALKPHOS 98  --   --   --    AST 17  --   --   --    ALT 26  --   --   --        LIVER PROFILE:   Recent Labs     12/27/19  0935   AST 17   ALT 26   BILIDIR <0.2   BILITOT 0.4   ALKPHOS 98       PT/INR:   Recent Labs     12/27/19  1250 12/28/19  0545 12/29/19  0619   PROTIME 12.0 11.8 12.6*   INR 1.0 1.0 1.1       APTT:   Recent Labs     12/27/19  1250 12/28/19  0545 12/29/19  0619   APTT 28.7 34.4 31.2       Fasting Lipid Panel:    No results found for: CHOL, TRIG, HDL    Cardiac Enzymes:    Lab Results   Component Value Date    TROPONINI <0.01 12/28/2019    TROPONINI <0.01 12/27/2019    TROPONINI <0.01 12/27/2019       Notable Cultures:      Blood cultures   Blood Culture, Routine   Date Value Ref Range Status   03/16/2015 5 Days- no growth  Final     Respiratory cultures No results found for: RESPCULTURE   Gram Stain Result   Date Value Ref Range Status   04/19/2017   Final    Gram stain performed from swab, interpret results with  caution. Swab specimens of sterile fluids are inferior to  aspirate specimens for organism recovery. Polymorphonuclear leukocytes not seen  Epithelial cells not seen  Few Gram positive cocci  Few Gram positive cocci in pairs  Rare Gram positive rods       Urine No results found for: LABURIN  Legionella No results found for: LABLEGI  C Diff PCR No results found for: CDIFPCR  Wound culture/abscess: No results for input(s): WNDABS in the last 72 hours. Tip culture:No results for input(s): CXCATHTIP in the last 72 hours. Resident's Assessment and PLan     Assessment:  1. Submassive pulmonary embolism, likely provoked by cancer  2. Atrial fibrillation, tachycardia   3. B/l LE edema likely 2/2 lymphedema vs side effect of steorids  4. CAD/STEMI status post stent placement  5. Hyperlipidemia  6. 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 IVC filter placed and patient tolerated procedure well;   2. Metoprolol 25 mg BID oral for Afib, lopressor 2.5 mg IV given once for acute control   3. Follow-up 2D echo ordered. 4. Due to monitor respiratory status  5. Stable for transfer     DVT/GI prophylaxis: No anticoagulation/PPI  Disposition: MICU, transfer to telemetry     [address] [place] [address]    Internal Medicine Resident -PGY2    Attending Physician: Dr. Josseline Beckford I personally saw, examined and provided care for the patient. Radiographs, labs and medication list were reviewed by me independently. I spoke with bedside nursing, therapists and consultants. Critical care services and times documented are independent of procedures and multidisciplinary rounds with Residents. Additionally comprehensive, multidisciplinary rounds were conducted with the MICU team. The case was discussed in detail and plans for care were established. Review of Residents documentation was conducted and revisions were made as appropriate. I agree with the above documented exam, problem list and plan of care.   Jun Vaughn D.O.

## 2019-12-29 NOTE — PROGRESS NOTES
Groin checked s/p IVC filter placement. Left going soft, no ecchymosis, no signs of hematoma. Patient states he is feeling fine. Vascular surgery to sign off, please call with any questions or concerns.      Electronically signed by Cm Sorto MD on 12/29/2019 at 9:02 AM

## 2019-12-29 NOTE — PROGRESS NOTES
12/29/2019    CALCIUM 7.9 12/29/2019    GFRAA >60 12/29/2019    LABGLOM >60 12/29/2019     PT/INR:    Lab Results   Component Value Date    PROTIME 12.6 12/29/2019    INR 1.1 12/29/2019     Last 3 Troponin:    Lab Results   Component Value Date    TROPONINI <0.01 12/28/2019    TROPONINI <0.01 12/27/2019    TROPONINI <0.01 12/27/2019     TSH:    Lab Results   Component Value Date    TSH 3.680 03/03/2014      Assessment:     Active Problems:    Bilateral pulmonary embolism (HCC)    Palliative care by specialist    S/P placement of IVC filter    Metastatic malignant melanoma  to the frontal lobe of the brain s/p gamma knife on immunological therapy      Plan:       Continue same plan and orders    Await on results of 2-D echo  Case management consulted re: kendy needs

## 2019-12-29 NOTE — PROGRESS NOTES
Neurosurg progress note  VITALS:  /89   Pulse 114   Temp 98.2 °F (36.8 °C) (Oral)   Resp 26   Ht 5' 10\" (1.778 m)   Wt (!) 329 lb (149.2 kg)   SpO2 93%   BMI 47.21 kg/m²   24HR INTAKE/OUTPUT:    Intake/Output Summary (Last 24 hours) at 2019 1430  Last data filed at 2019 0900  Gross per 24 hour   Intake 1140 ml   Output 2175 ml   Net -1035 ml     Ct Head W Wo Contrast    Result Date: 2019  Patient MRN:  [id number] : 1950 Age: 77 years Gender: Male Order Date:  2019 11:30 AM EXAM: CT HEAD W WO CONTRAST NUMBER OF IMAGES:  299 INDICATION:  increased weakness in legs, dizziness, lightheadedness increased weakness in legs, dizziness, lightheadedness COMPARISON: None Technique: Low-dose CT  acquisition technique included one of following options; 1 . Automated exposure control, 2. Adjustment of MA and or KV according to patient's size or 3. Use of iterative reconstruction. Multiple CT sections were obtained with sagittal and coronal MPR reconstructions. The ventricles are prominent. The gyri and sulci appear  prominent. The white matter appears  prominent. There is no evidence for hemorrhage. There is no infarct identified. There is mass effect in the right frontal lobe following administration contrast there is an enhancing mass. The mass measures 2.6 x 1.8 cm. Diffuse atrophy likely age related Findings compatible with small vessel ischemic changes. Mass in the left frontal lobe, the differential would include metastatic disease, versus primary neoplasm such as a glioma. Given the patient's history of melanoma, metastatic melanoma could give this appearance ALERT:  THIS IS AN ABNORMAL REPORT     Cta Chest W Contrast    Addendum Date: 2019    Pleural-based nodular density in the left lung base. Consider surveillance.     Result Date: 2019  Patient MRN:  [id number] : 1950 Age: 77 years Gender: Male Order Date:  2019 9:15 AM EXAM: CTA CHEST W CONTRAST Date:  2019 7:00 PM EXAM: US DUP UPPER EXTREMITIES BILATERAL VENOUS NUMBER OF IMAGES:  44 INDICATION:  DVT evaluation DVT evaluation COMPARISON: None TECHNIQUE: Allen Councilman scale as well as color and spectral duplex sonography of the bilateral upper extremity deep venous systems was performed. FINDINGS: There is normal compression, flow, and augmentation response in the bilateral internal jugular, subclavian, axillary, brachial, basilic, cephalic, ulnar and radial veins as seen. No sonographic evidence of deep venous thrombosis in the bilateral upper extremities as seen. Us Dup Lower Extremity Left Guillermo    Result Date: 2019  Patient MRN:  [id number] : 1950 Age: 77 years Gender: Male Order Date:  2019 9:15 AM EXAM: US DUP LOWER EXTREMITY LEFT GUILLERMO NUMBER OF IMAGES:  28 INDICATION:  dvt dvt COMPARISON: None There is no evidence for deep venous thrombosis There is good compressibility, there is good augmentation, there is good color flow.      No evidence for deep venous thrombosis     CBC:   Lab Results   Component Value Date    WBC 6.9 2019    RBC 3.89 2019    HGB 11.7 2019    HCT 38.1 2019    MCV 97.9 2019    MCH 30.1 2019    MCHC 30.7 2019    RDW 16.1 2019     2019    MPV 8.6 2019     BMP:    Lab Results   Component Value Date     2019    K 3.7 2019    K 3.7 2019     2019    CO2 22 2019    BUN 14 2019    LABALBU 3.3 2019    CREATININE 1.0 2019    CALCIUM 7.9 2019    GFRAA >60 2019    LABGLOM >60 2019    GLUCOSE 100 2019      metoprolol  2.5 mg Intravenous Once    metoprolol tartrate  25 mg Oral BID    sodium chloride flush  10 mL Intravenous 2 times per day    pantoprazole  40 mg Intravenous Daily    And    sodium chloride (PF)  10 mL Intravenous Daily    ipratropium-albuterol  1 ampule Inhalation 4x daily    Apremilast  30 mg Oral Daily  docusate sodium  100 mg Oral Daily     Resting quitely  Assessment:  Patient Active Problem List   Diagnosis    Ulnar neuropathy at elbow    Superficial incisional surgical site infection    Bilateral pulmonary embolism ([facility]    Palliative care by specialist     Plan:Discussed case with medical residents extensively Continue current care  Cara Almendarez M.D.

## 2019-12-30 NOTE — PROGRESS NOTES
Subjective: The patient is awake and alert in bed. No problems overnight. Denies chest pain, angina, dyspnea or abdominal discomfort. No nausea or vomiting. No fevers or chills. Tolerating diet. Objective:    BP (!) 143/128   Pulse 110   Temp 98.7 °F (37.1 °C) (Temporal)   Resp 18   Ht 5' 10\" (1.778 m)   Wt (!) 322 lb (146.1 kg)   SpO2 93%   BMI 46.20 kg/m²     General: NAD, pleasant   HEENT: No thrush or mucositis, EOMI, PERRLA  Heart: Tachycardic, no murmurs, gallops, or rubs.   Lungs:  CTA bilaterally, no wheeze, rales or rhonchi  Abd: bowel sounds present, nontender, nondistended, no masses  Extrem:+4 BLE pitting edema, R>L, no clubbing or cyanosis  Lymphatics: No palpable adenopathy in cervical and supraclavicular regions  Skin: Intact, no petechia or purpura    CBC with Differential:    Lab Results   Component Value Date    WBC 6.0 12/30/2019    RBC 3.84 12/30/2019    HGB 12.0 12/30/2019    HCT 37.3 12/30/2019     12/30/2019    MCV 97.1 12/30/2019    MCH 31.3 12/30/2019    MCHC 32.2 12/30/2019    RDW 15.9 12/30/2019    SEGSPCT 73 02/28/2014    LYMPHOPCT 12.2 12/27/2019    MONOPCT 4.4 12/27/2019    BASOPCT 0.7 12/27/2019    MONOSABS 0.30 12/27/2019    LYMPHSABS 0.83 12/27/2019    EOSABS 0.06 12/27/2019    BASOSABS 0.05 12/27/2019     CMP:    Lab Results   Component Value Date     12/30/2019    K 4.1 12/30/2019    K 3.7 12/27/2019     12/30/2019    CO2 20 12/30/2019    BUN 11 12/30/2019    CREATININE 0.8 12/30/2019    GFRAA >60 12/30/2019    LABGLOM >60 12/30/2019    GLUCOSE 92 12/30/2019    PROT 5.5 12/27/2019    LABALBU 3.3 12/27/2019    CALCIUM 8.3 12/30/2019    BILITOT 0.4 12/27/2019    ALKPHOS 98 12/27/2019    AST 17 12/27/2019    ALT 26 12/27/2019         Current Facility-Administered Medications:     metoprolol tartrate (LOPRESSOR) tablet 50 mg, 50 mg, Oral, BID, Lucita Ferraro MD    sodium chloride flush 0.9 % injection 10 mL, 10 mL, Intravenous, 2 times per

## 2019-12-30 NOTE — PROGRESS NOTES
sputum expectoration, chest pain. CARDIOVASCULAR: Denies palpitation   GASTROINTESTINAL: Denies abdominal pain, diarrhea or constipation. GENITOURINARY: Denies burning urination or frequency of urination   INTEGUMENT: denies wound, rash   HEMATOLOGIC/LYMPHATIC: Denies lymph node swelling, gum bleeding or easy bruising. MUSCULOSKELETAL: + leg pain,+significant LE swelling  NEUROLOGICAL: Denies light headed, dizziness, loss of consciousness, +weakness      Vitals:   Vitals:    12/30/19 0400 12/30/19 0500 12/30/19 0600 12/30/19 0916   BP: (!) 126/112 (!) 156/111 (!) 143/128 (!) 141/102   Pulse: 98 98 110 110   Resp: 15 14 18    Temp: 98.7 °F (37.1 °C)      TempSrc: Temporal      SpO2: 96% 95% 93%    Weight:   (!) 322 lb (146.1 kg)    Height:           Physical Exam:    Gen:  Obese, Alert, awake, following commands, in no acute distress  HEENT: Pupils equal and round; reactive to light  Neck: Supple  Lungs: CTA bilaterally; no audible rhonchi or wheezes noted; breathing easy  Heart: A-fib/RVR; irregular;  no murmur, rub, or gallop noted during exam  Abd: Soft, non tender, non distended, BS+  Ext: Moving all extremities, ++ edema, pulses present  Skin:  Warm and dry  Neuro: Alert, oriented x 3; following commands    Impression  Active Problems:    Bilateral pulmonary embolism (HCC)    Palliative care by specialist  Resolved Problems:    * No resolved hospital problems. *      Assessment:   No Sharma is a 77 y.o. male with significant past medical history of  Melanoma (on Keytruda) presented with worsening shortness of breath, increased leg swelling, lightheadedness, and fatigue for ~1 month found with bilateral PE and brain met in left frontal lobe (known to pt); has had gamma knife in the past at that site. Neurosurgery was consulted- started on keppra; recommendation for no anticoagulation; plan for IVC filter today. Continue current care. 12/30; pt underwent IVC filter placement on 12/28; tolerated. No new issues. Breathing easy. Likely will transfer out of the ICU today. LE still with significant swelling. Palliative Medicine will now sign off. No further needs at this time. Feel free to re consult if needed. Plan:   1. Submassive pulmonary embolism- IVC filter placement; vascular following  2. Hx of Melanoma with brain mets (s/p gamma knife/keytruda/optivo)- Neurosurgery and Hem/Onc consulted; plan to continue with keytruda through the [facility]  3. A-fib- monitor; rate control  4. Family support - no family at bedside; has contact information for PM if needed     GOALS- continue current care; continue to seek treatment for melanoma; hopes to return home    Erwin SINGER-CNS, [facility]  Palliative Medicine    Time in minutes: 15    More than 50% of this interaction was related to counseling and information given r/t disease process; plan of care; and code status. Discussed patient and the plan of care with the other IDT members of Palliative Care Team as well as with patient, family and staff nurse. Thank you for allowing Palliative Medicine to participate in this patient's care.

## 2019-12-30 NOTE — PROGRESS NOTES
Admit Date: 12/27/2019      Subjective:     Patient: no acute issues reported overnight  Medication side effects: none    Scheduled Meds:   metoprolol tartrate  25 mg Oral BID    sodium chloride flush  10 mL Intravenous 2 times per day    pantoprazole  40 mg Intravenous Daily    And    sodium chloride (PF)  10 mL Intravenous Daily    ipratropium-albuterol  1 ampule Inhalation 4x daily    Apremilast  30 mg Oral Daily    docusate sodium  100 mg Oral Daily     Continuous Infusions:   dextrose       PRN Meds:sodium chloride flush, magnesium hydroxide, ondansetron, acetaminophen, polyethylene glycol, glucose, dextrose, glucagon (rDNA), dextrose    Objective:   I/O last 3 completed shifts: In: 750 [P.O.:750]  Out: 7731 [Urine:1525]  No intake/output data recorded.     BP (!) 156/111   Pulse 98   Temp 98.7 °F (37.1 °C) (Temporal)   Resp 14   Ht 5' 10\" (1.778 m)   Wt (!) 322 lb (146.1 kg)   SpO2 95%   BMI 46.20 kg/m²   General appearance: alert, appears stated age and cooperative  Skin:negative  Heent:negative  Lungs: clear to auscultation bilaterally  Heart: irregularly irregular rhythm  Abdomen: soft, non-tender; bowel sounds normal; no masses,  no organomegaly  Extremities:no edema  Neurologic: Grossly normal    Labs:  CBC with Differential:    Lab Results   Component Value Date    WBC 6.9 12/29/2019    RBC 3.89 12/29/2019    HGB 11.7 12/29/2019    HCT 38.1 12/29/2019     12/29/2019    MCV 97.9 12/29/2019    MCH 30.1 12/29/2019    MCHC 30.7 12/29/2019    RDW 16.1 12/29/2019    SEGSPCT 73 02/28/2014    LYMPHOPCT 12.2 12/27/2019    MONOPCT 4.4 12/27/2019    BASOPCT 0.7 12/27/2019    MONOSABS 0.30 12/27/2019    LYMPHSABS 0.83 12/27/2019    EOSABS 0.06 12/27/2019    BASOSABS 0.05 12/27/2019     BMP:    Lab Results   Component Value Date     12/29/2019    K 3.7 12/29/2019    K 3.7 12/27/2019     12/29/2019    CO2 22 12/29/2019    BUN 14 12/29/2019    LABALBU 3.3 12/27/2019    CREATININE 1.0 12/29/2019    CALCIUM 7.9 12/29/2019    GFRAA >60 12/29/2019    LABGLOM >60 12/29/2019     PT/INR:    Lab Results   Component Value Date    PROTIME 12.6 12/29/2019    INR 1.1 12/29/2019     Last 3 Troponin:    Lab Results   Component Value Date    TROPONINI <0.01 12/28/2019    TROPONINI <0.01 12/27/2019    TROPONINI <0.01 12/27/2019     TSH:    Lab Results   Component Value Date    TSH 3.680 03/03/2014      Assessment:     1. Bilateral pulmonary embolism s/p IVC filter  2. Chronic atrial fibrillation  3. CAD  4. Lymphedema of the bilateral LE's  5.  Metastatic malignant of the skin to the brain and lymph nodes    Plan:       Continue same plan and orders    PT/OT; 2-D echo normal

## 2019-12-30 NOTE — PROGRESS NOTES
[facility]   Department of Internal Medicine   Internal Medicine Residency  MICU Progress Note    Patient:  Kinjal Segura 77 y.o. male   MRN: [id number]       Date of Service: 12/30/2019    Allergy: Patient has no known allergies. Subjective     - The patient was seen and examined, he has no new complaints today. - He is on room air, and saturating at 95%  - Remains hypertensive and mildly tachycardic, Toprol increased to 50mg bid    Objective     Vitals:    12/30/19 1100 12/30/19 1200 12/30/19 1300 12/30/19 1400   BP: (!) 121/99 (!) 145/97 (!) 131/92 (!) 128/92   Pulse: 102 103 97 103   Resp: 16 22 29 15   Temp:       TempSrc:       SpO2: 94% (!) 89% 93% 92%   Weight:       Height:           Physical Exam:  · I & O - 24hr:No intake/output data recorded. Physical Exam  Constitutional:       General: He is not in acute distress. Appearance: Normal appearance. He is well-developed. He is obese. He is not diaphoretic. HENT:      Head: Normocephalic and atraumatic. Eyes:      General: No scleral icterus. Conjunctiva/sclera: Conjunctivae normal.      Pupils: Pupils are equal, round, and reactive to light. Cardiovascular:      Rate and Rhythm: Tachycardia present. Rhythm irregular. Heart sounds: Normal heart sounds, S1 normal and S2 normal. No murmur. Pulmonary:      Effort: Pulmonary effort is normal. No respiratory distress. Breath sounds: Normal breath sounds. No wheezing or rales. Abdominal:      General: Bowel sounds are normal. There is no distension. Palpations: Abdomen is soft. There is no mass. Tenderness: There is no tenderness. There is no guarding. Musculoskeletal:         General: Swelling present. No tenderness. Right lower leg: Edema present. Left lower leg: Edema present. Skin:     General: Skin is warm and dry. Neurological:      Mental Status: He is alert and oriented to person, place, and time.       Cranial Nerves: No cranial Stain Result   Date Value Ref Range Status   04/19/2017   Final    Gram stain performed from swab, interpret results with  caution. Swab specimens of sterile fluids are inferior to  aspirate specimens for organism recovery. Polymorphonuclear leukocytes not seen  Epithelial cells not seen  Few Gram positive cocci  Few Gram positive cocci in pairs  Rare Gram positive rods       Urine No results found for: LABURIN  Legionella No results found for: LABLEGI  C Diff PCR No results found for: CDIFPCR  Wound culture/abscess: No results for input(s): WNDABS in the last 72 hours. Tip culture:No results for input(s): CXCATHTIP in the last 72 hours. Resident's Assessment and PLan     Assessment:  1. Submassive pulmonary embolism, likely 2/2 #6. ECHO wth EF 70%, moderate LVH, no regional wall motion abnormalities  2. Atrial fibrillation, tachycardia   3. B/l LE edema likely 2/2 lymphedema vs DVT and LAD  4. CAD/STEMI status post stent placement  5. Hyperlipidemia  6. 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 IVC filter placed and patient tolerated procedure well  2. Metoprolol 50 mg BID oral for Afib, lopressor PRN for further rate control  3. Ct to monitor respiratory status  4. Stable for transfer   5. Outpatient f/u with heme/onc    DVT/GI prophylaxis: No anticoagulation/PPI  Disposition: MICU, transfer to telemetry     Sebas Rodriguez MD   Internal Medicine Resident - PGY2    Attending Physician: Dr. Lear Alpers    Addendum ICU Attending Statement     Elgie Bumpers was seen, examined and discussed with the multi-disciplinary ICU team during rounds. A addendum note may be separate to the residents note. If not then, I have personally seen and examined the patient and the key elements of the encounter were performed by me (> 85 % time).   The medications & laboratory data was discussed and adjusted where necessary. The radiographic images were reviewed either as a group or with radiologist.  Any changes are document or changed if felt dis-concordant with the exam or history. The above findings were corroborated, plans confirmed and changes made if needed. Family was updated at the bedside as available. Key issues of the case were discussed among consultants. Critical Care time is documented if appropriate.       Katia Fish DO, MPH  Professor of Medicine

## 2019-12-31 NOTE — PROGRESS NOTES
Admit Date: 12/27/2019      Subjective:     Patient: no acute issues reported overnight  Medication side effects: none    Scheduled Meds:   metoprolol tartrate  50 mg Oral BID    sodium chloride flush  10 mL Intravenous 2 times per day    pantoprazole  40 mg Intravenous Daily    And    sodium chloride (PF)  10 mL Intravenous Daily    ipratropium-albuterol  1 ampule Inhalation 4x daily    Apremilast  30 mg Oral Daily    docusate sodium  100 mg Oral Daily     Continuous Infusions:   dextrose       PRN Meds:sodium chloride flush, magnesium hydroxide, ondansetron, acetaminophen, polyethylene glycol, glucose, dextrose, glucagon (rDNA), dextrose    Objective:   I/O last 3 completed shifts: In: 200 [P.O.:420; I.V.:10]  Out: 1175 [Urine:1175]  I/O this shift:  In: 140 [P.O.:120;  I.V.:20]  Out: 200 [Urine:200]    /82   Pulse 95   Temp 98.4 °F (36.9 °C) (Oral)   Resp 21   Ht 5' 10\" (1.778 m)   Wt (!) 322 lb (146.1 kg)   SpO2 90%   BMI 46.20 kg/m²   General appearance: alert, appears stated age and cooperative  Skin:negative  Heent:negative  Lungs: clear to auscultation bilaterally  Heart: irregularly irregular rhythm  Abdomen: soft, non-tender; bowel sounds normal; no masses,  no organomegaly  Extremities:no edema  Neurologic: Grossly normal    Labs:  CBC with Differential:    Lab Results   Component Value Date    WBC 6.9 12/31/2019    RBC 3.86 12/31/2019    HGB 11.8 12/31/2019    HCT 37.5 12/31/2019     12/31/2019    MCV 97.2 12/31/2019    MCH 30.6 12/31/2019    MCHC 31.5 12/31/2019    RDW 16.3 12/31/2019    SEGSPCT 73 02/28/2014    LYMPHOPCT 12.2 12/27/2019    MONOPCT 4.4 12/27/2019    BASOPCT 0.7 12/27/2019    MONOSABS 0.30 12/27/2019    LYMPHSABS 0.83 12/27/2019    EOSABS 0.06 12/27/2019    BASOSABS 0.05 12/27/2019     BMP:    Lab Results   Component Value Date     12/30/2019    K 4.1 12/30/2019    K 3.7 12/27/2019     12/30/2019    CO2 20 12/30/2019    BUN 11 12/30/2019    LABALBU

## 2019-12-31 NOTE — PROGRESS NOTES
4+ in BL LE but improving       Medications     Continuous Infusions:   dextrose       Scheduled Meds:   mometasone-formoterol  2 puff Inhalation BID    metoprolol tartrate  50 mg Oral BID    sodium chloride flush  10 mL Intravenous 2 times per day    pantoprazole  40 mg Intravenous Daily    And    sodium chloride (PF)  10 mL Intravenous Daily    ipratropium-albuterol  1 ampule Inhalation 4x daily    Apremilast  30 mg Oral Daily    docusate sodium  100 mg Oral Daily     PRN Meds: sodium chloride flush, magnesium hydroxide, ondansetron, acetaminophen, polyethylene glycol, glucose, dextrose, glucagon (rDNA), dextrose  Nutrition:       Labs and Imaging Studies     CBC:   Recent Labs     12/29/19 0619 12/30/19 0600 12/31/19  0553   WBC 6.9 6.0 6.9   RBC 3.89 3.84 3.86   HGB 11.7* 12.0* 11.8*   HCT 38.1 37.3 37.5   MCV 97.9 97.1 97.2   MCH 30.1 31.3 30.6   MCHC 30.7* 32.2 31.5*   RDW 16.1* 15.9* 16.3*    179 190   MPV 8.6 8.7 8.9       BMP:    Recent Labs     12/29/19 0619 12/30/19 0600 12/31/19  0553    137 136   K 3.7 4.1 3.9    101 101   CO2 22 20* 20*   BUN 14 11 17   CREATININE 1.0 0.8 0.9   GLUCOSE 100* 92 102*   CALCIUM 7.9* 8.3* 8.0*       LIVER PROFILE:   No results for input(s): AST, ALT, LIPASE, BILIDIR, BILITOT, ALKPHOS in the last 72 hours. Invalid input(s):   AMYLASE,  ALB    PT/INR:   Recent Labs     12/29/19 0619 12/30/19 0600 12/31/19  0553   PROTIME 12.6* 13.0* 13.4*   INR 1.1 1.2 1.2       APTT:   Recent Labs     12/29/19 0619 12/30/19 0600 12/31/19  0553   APTT 31.2 33.1 30.3       Fasting Lipid Panel:    No results found for: CHOL, TRIG, HDL    Cardiac Enzymes:    Lab Results   Component Value Date    TROPONINI <0.01 12/28/2019    TROPONINI <0.01 12/27/2019    TROPONINI <0.01 12/27/2019       Notable Cultures:      Blood cultures   Blood Culture, Routine   Date Value Ref Range Status   12/29/2019 24 Hours- no growth  Preliminary     Respiratory cultures No The medications & laboratory data was discussed and adjusted where necessary. The radiographic images were reviewed either as a group or with radiologist.  Any changes are document or changed if felt dis-concordant with the exam or history. The above findings were corroborated, plans confirmed and changes made if needed. Family was updated at the bedside as available. Key issues of the case were discussed among consultants. Critical Care time is documented if appropriate.       Agnes Melendrez DO, MPH  Professor of Medicine

## 2019-12-31 NOTE — PLAN OF CARE
Problem: Gas Exchange - Impaired:  Goal: Levels of oxygenation will improve  Description  Levels of oxygenation will improve  Outcome: Met This Shift

## 2019-12-31 NOTE — PROGRESS NOTES
CONTRAST number of images 545 including MIP coronal and sagittal reconstruction images. Contrast. Isovue-370, 100 mL intravenously. Technique: Low-dose CT  acquisition technique included one of following options; 1 . Automated exposure control, 2. Adjustment of MA and or KV according to patient's size or 3. Use of iterative reconstruction. INDICATION:  r/o PE, hx of cancer, SOB/weak/lightheaded r/o PE, hx of cancer, SOB/weak/lightheaded COMPARISON: None FINDINGS: There is borderline cardiac size. There is aneurysm of the ascending thoracic aorta measuring 4.3 x 4.3 cm. There is also mild aneurysm of the abdominal aorta measuring 3.9 cm. There is coronary artery calcification. Small to moderate lymph nodes are identified in the mediastinum with lymph nodes measuring up to 1.2 cm in the AP window and similar 1's in the prevascular space and smaller 1's in the paratracheal region. There are filling defects in the right upper lobe and right lower lobe pulmonary arteries and small filling defect in the segmental branches of left upper lobe concerning for bilateral pulmonary embolism. There is minimal atelectasis/infiltrates in the lung bases bilaterally more on the right side. Some pleural-based nodular density in the left costophrenic angle is noted. The liver is of normal architecture. Gallbladder is distended. Degenerative changes are identified in the thoracic spine. Bilateral pulmonary embolism with involvement of the right upper lobe and right lower lobe and segmental branches of left upper lobe. Minimal atelectasis/infiltrates and pleural effusions in the lung bases. Aortic aneurysm with involvement of the ascending thoracic aorta and the abdominal aorta. Surveillance is recommended. The above findings were telephoned to the ED physician.  ALERT:  THIS IS AN ABNORMAL REPORT     Us Dup Upper Extremities Bilateral Venous    Result Date: 2019  Patient MRN:  [id number] : 1950 Age: 77 years Gender: Male Order Date:  2019 7:00 PM EXAM: US DUP UPPER EXTREMITIES BILATERAL VENOUS NUMBER OF IMAGES:  44 INDICATION:  DVT evaluation DVT evaluation COMPARISON: None TECHNIQUE: [place] scale as well as color and spectral duplex sonography of the bilateral upper extremity deep venous systems was performed. FINDINGS: There is normal compression, flow, and augmentation response in the bilateral internal jugular, subclavian, axillary, brachial, basilic, cephalic, ulnar and radial veins as seen. No sonographic evidence of deep venous thrombosis in the bilateral upper extremities as seen. Us Dup Lower Extremity Left Guillermo    Result Date: 2019  Patient MRN:  [id number] : 1950 Age: 77 years Gender: Male Order Date:  2019 9:15 AM EXAM: US DUP LOWER EXTREMITY LEFT GUILLERMO NUMBER OF IMAGES:  28 INDICATION:  dvt dvt COMPARISON: None There is no evidence for deep venous thrombosis There is good compressibility, there is good augmentation, there is good color flow. No evidence for deep venous thrombosis     Fluoro For Surgical Procedures    Result Date: 2019  Patient MRN: [id number] : 1950 Age:  77 years Gender: Male Order Date: 2019 3:15 PM Exam: FLUORO FOR SURGICAL PROCEDURES Number of Images: 1 Indication:   vena cava filter Comparison: None. Fluoroscopy time: 76.5 seconds Findings: On the single submitted image, an inferior vena cava filter and its deployment catheter are visualized. Intraoperative fluoroscopy for inferior vena cava filter placement. The study was dictated by Brennan Johnson PA-C and Maksim Mcclure MD reviewed and concurred with the findings.     CBC:   Lab Results   Component Value Date    WBC 6.9 2019    RBC 3.86 2019    HGB 11.8 2019    HCT 37.5 2019    MCV 97.2 2019    MCH 30.6 2019    MCHC 31.5 2019    RDW 16.3 2019     2019    MPV 8.9 2019     BMP:    Lab Results   Component Value Date     12/31/2019    K 3.9 12/31/2019    K 3.7 12/27/2019     12/31/2019    CO2 20 12/31/2019    BUN 17 12/31/2019    LABALBU 3.3 12/27/2019    CREATININE 0.9 12/31/2019    CALCIUM 8.0 12/31/2019    GFRAA >60 12/31/2019    LABGLOM >60 12/31/2019    GLUCOSE 102 12/31/2019      mometasone-formoterol  2 puff Inhalation BID    calcium gluconate IVPB  1 g Intravenous Once    metoprolol tartrate  50 mg Oral BID    sodium chloride flush  10 mL Intravenous 2 times per day    pantoprazole  40 mg Intravenous Daily    And    sodium chloride (PF)  10 mL Intravenous Daily    ipratropium-albuterol  1 ampule Inhalation 4x daily    Apremilast  30 mg Oral Daily    docusate sodium  100 mg Oral Daily     Awake and alert, follows commands perrl eomi  Assessment:  Patient Active Problem List   Diagnosis    Ulnar neuropathy at elbow    Superficial incisional surgical site infection    Bilateral pulmonary embolism ([address]    Palliative care by specialist     Plan:Continue current care  Gabino Boast M.D.

## 2019-12-31 NOTE — PLAN OF CARE
Problem: Falls - Risk of:  Goal: Will remain free from falls  Description  Will remain free from falls  12/31/2019 0624 by Ailyn Moraes RN  Outcome: Met This Shift     Problem: Falls - Risk of:  Goal: Absence of physical injury  Description  Absence of physical injury  12/31/2019 1101 by Ailyn Moraes RN  Outcome: Met This Shift     Problem: Breathing Pattern - Ineffective:  Goal: Ability to achieve and maintain a regular respiratory rate will improve  Description  Ability to achieve and maintain a regular respiratory rate will improve  12/31/2019 0624 by Ailyn Moraes RN  Outcome: Met This Shift     Problem: Risk for Impaired Skin Integrity  Goal: Tissue integrity - skin and mucous membranes  Description  Structural intactness and normal physiological function of skin and  mucous membranes.   12/31/2019 0624 by Ailyn Moraes RN  Outcome: Met This Shift

## 2019-12-31 NOTE — PROGRESS NOTES
proven locally recurrent disease from R groin LN dissection on 10/24/18 with 2 of 10 LN's positive for malignant melanoma. Started Ana Lily on 12/11/18. 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 He had MRI brain done in [place] on 12/16/19 that revealed response with decrease in CNS lesion. He was to see Dr. Devin Phillips for evaluation of his proximal weakness and tremor. These symptoms were not consistent with his most recent MRI findings from [place]. He last dose of Keytruda was on 12/17/19. He was admitted for bilateral pulmonary embolism. He is not a candidate for   anticoagulation due to his brain metastasis. Vascular surgery was consulted.    - On 12/28, patient had IVC filter placed    Plan:  Patient tolerated IVC filter placement, possible transfer out of the unit today   Monitor respiratory status  Daily CBC, CMP, counts are stable  Will continue to follow  Patient will [facility] up at the [facility] upon discharge to continue outpatient treatment.       Electronically signed by LUCRECIA Agustin NP on 12/31/2019 at 8:10 AM

## 2020-01-01 ENCOUNTER — HOSPITAL ENCOUNTER (EMERGENCY)
Age: 70
End: 2020-01-04
Attending: EMERGENCY MEDICINE
Payer: MEDICARE

## 2020-01-01 ENCOUNTER — APPOINTMENT (OUTPATIENT)
Dept: GENERAL RADIOLOGY | Age: 70
End: 2020-01-01
Payer: MEDICARE

## 2020-01-01 VITALS
BODY MASS INDEX: 43.88 KG/M2 | TEMPERATURE: 97.1 F | WEIGHT: 306.5 LBS | DIASTOLIC BLOOD PRESSURE: 98 MMHG | SYSTOLIC BLOOD PRESSURE: 144 MMHG | RESPIRATION RATE: 14 BRPM | HEART RATE: 118 BPM | OXYGEN SATURATION: 93 % | HEIGHT: 70 IN

## 2020-01-01 VITALS
OXYGEN SATURATION: 87 % | RESPIRATION RATE: 35 BRPM | HEART RATE: 81 BPM | HEIGHT: 70 IN | BODY MASS INDEX: 43.81 KG/M2 | DIASTOLIC BLOOD PRESSURE: 49 MMHG | SYSTOLIC BLOOD PRESSURE: 71 MMHG | WEIGHT: 306 LBS

## 2020-01-01 LAB
ADENOVIRUS BY PCR: NOT DETECTED
ALBUMIN SERPL-MCNC: 3.7 G/DL (ref 3.5–5.2)
ALP BLD-CCNC: 126 U/L (ref 40–129)
ALT SERPL-CCNC: 114 U/L (ref 0–40)
AMYLASE: 105 U/L (ref 20–100)
ANION GAP SERPL CALCULATED.3IONS-SCNC: 14 MMOL/L (ref 7–16)
ANION GAP SERPL CALCULATED.3IONS-SCNC: 31 MMOL/L (ref 7–16)
ANISOCYTOSIS: ABNORMAL
APTT: 32.3 SEC (ref 24.5–35.1)
AST SERPL-CCNC: 191 U/L (ref 0–39)
B.E.: -19.2 MMOL/L (ref -3–0)
BACTERIA: NORMAL /HPF
BASOPHILS ABSOLUTE: 0 E9/L (ref 0–0.2)
BASOPHILS RELATIVE PERCENT: 0 % (ref 0–2)
BILIRUB SERPL-MCNC: 2.8 MG/DL (ref 0–1.2)
BILIRUBIN URINE: ABNORMAL
BLOOD CULTURE, ROUTINE: NORMAL
BLOOD, URINE: ABNORMAL
BORDETELLA PARAPERTUSSIS BY PCR: NOT DETECTED
BORDETELLA PERTUSSIS BY PCR: NOT DETECTED
BUN BLDV-MCNC: 18 MG/DL (ref 8–23)
BUN BLDV-MCNC: 44 MG/DL (ref 8–23)
BURR CELLS: ABNORMAL
CALCIUM SERPL-MCNC: 8.1 MG/DL (ref 8.6–10.2)
CALCIUM SERPL-MCNC: 8.7 MG/DL (ref 8.6–10.2)
CHLAMYDOPHILIA PNEUMONIAE BY PCR: NOT DETECTED
CHLORIDE BLD-SCNC: 101 MMOL/L (ref 98–107)
CHLORIDE BLD-SCNC: 92 MMOL/L (ref 98–107)
CLARITY: CLEAR
CO2: 12 MMOL/L (ref 22–29)
CO2: 21 MMOL/L (ref 22–29)
COLOR: YELLOW
CORONAVIRUS 229E BY PCR: NOT DETECTED
CORONAVIRUS HKU1 BY PCR: NOT DETECTED
CORONAVIRUS NL63 BY PCR: NOT DETECTED
CORONAVIRUS OC43 BY PCR: NOT DETECTED
CREAT SERPL-MCNC: 0.9 MG/DL (ref 0.7–1.2)
CREAT SERPL-MCNC: 3.3 MG/DL (ref 0.7–1.2)
CRITICAL NOTIFICATION: YES
CULTURE, BLOOD 2: NORMAL
DEVICE: ABNORMAL
EKG ATRIAL RATE: 115 BPM
EKG ATRIAL RATE: 214 BPM
EKG Q-T INTERVAL: 242 MS
EKG Q-T INTERVAL: 338 MS
EKG QRS DURATION: 72 MS
EKG QRS DURATION: 72 MS
EKG QTC CALCULATION (BAZETT): 421 MS
EKG QTC CALCULATION (BAZETT): 463 MS
EKG R AXIS: -28 DEGREES
EKG R AXIS: -45 DEGREES
EKG T AXIS: 59 DEGREES
EKG T AXIS: 80 DEGREES
EKG VENTRICULAR RATE: 113 BPM
EKG VENTRICULAR RATE: 182 BPM
EOSINOPHILS ABSOLUTE: 0 E9/L (ref 0.05–0.5)
EOSINOPHILS RELATIVE PERCENT: 0 % (ref 0–6)
EPITHELIAL CELLS, UA: NORMAL /HPF
GFR AFRICAN AMERICAN: 23
GFR AFRICAN AMERICAN: >60
GFR NON-AFRICAN AMERICAN: 19 ML/MIN/1.73
GFR NON-AFRICAN AMERICAN: >60 ML/MIN/1.73
GLUCOSE BLD-MCNC: 209 MG/DL (ref 74–99)
GLUCOSE BLD-MCNC: 99 MG/DL (ref 74–99)
GLUCOSE URINE: NEGATIVE MG/DL
HCO3 ARTERIAL: 7.8 MMOL/L (ref 22–26)
HCT VFR BLD CALC: 38.1 % (ref 37–54)
HCT VFR BLD CALC: 42.6 % (ref 37–54)
HEMOGLOBIN: 11.8 G/DL (ref 12.5–16.5)
HEMOGLOBIN: 12.8 G/DL (ref 12.5–16.5)
HUMAN METAPNEUMOVIRUS BY PCR: NOT DETECTED
HUMAN RHINOVIRUS/ENTEROVIRUS BY PCR: NOT DETECTED
INFLUENZA A BY PCR: NOT DETECTED
INFLUENZA B BY PCR: NOT DETECTED
INR BLD: 1.5
KETONES, URINE: NEGATIVE MG/DL
LACTIC ACID, SEPSIS: 10.7 MMOL/L (ref 0.5–1.9)
LEUKOCYTE ESTERASE, URINE: ABNORMAL
LIPASE: 49 U/L (ref 13–60)
LYMPHOCYTES ABSOLUTE: 1.76 E9/L (ref 1.5–4)
LYMPHOCYTES RELATIVE PERCENT: 11.3 % (ref 20–42)
MAGNESIUM: 2.4 MG/DL (ref 1.6–2.6)
MCH RBC QN AUTO: 29.8 PG (ref 26–35)
MCH RBC QN AUTO: 30.7 PG (ref 26–35)
MCHC RBC AUTO-ENTMCNC: 30 % (ref 32–34.5)
MCHC RBC AUTO-ENTMCNC: 31 % (ref 32–34.5)
MCV RBC AUTO: 102.2 FL (ref 80–99.9)
MCV RBC AUTO: 96.2 FL (ref 80–99.9)
METAMYELOCYTES RELATIVE PERCENT: 3.5 % (ref 0–1)
METER GLUCOSE: 100 MG/DL (ref 74–99)
METER GLUCOSE: 104 MG/DL (ref 74–99)
METER GLUCOSE: 123 MG/DL (ref 74–99)
METER GLUCOSE: 87 MG/DL (ref 74–99)
METER GLUCOSE: 96 MG/DL (ref 74–99)
METER GLUCOSE: 98 MG/DL (ref 74–99)
MONOCYTES ABSOLUTE: 0.48 E9/L (ref 0.1–0.95)
MONOCYTES RELATIVE PERCENT: 2.6 % (ref 2–12)
MYCOPLASMA PNEUMONIAE BY PCR: NOT DETECTED
MYELOCYTE PERCENT: 1.7 % (ref 0–0)
NEUTROPHILS ABSOLUTE: 13.6 E9/L (ref 1.8–7.3)
NEUTROPHILS RELATIVE PERCENT: 80 % (ref 43–80)
NITRITE, URINE: NEGATIVE
NUCLEATED RED BLOOD CELLS: 0 /100 WBC
O2 SATURATION: 99.5 % (ref 92–98.5)
OPERATOR ID: 754
PARAINFLUENZA VIRUS 1 BY PCR: NOT DETECTED
PARAINFLUENZA VIRUS 2 BY PCR: NOT DETECTED
PARAINFLUENZA VIRUS 3 BY PCR: NOT DETECTED
PARAINFLUENZA VIRUS 4 BY PCR: NOT DETECTED
PCO2 ARTERIAL: 22.5 MMHG (ref 35–45)
PDW BLD-RTO: 16.2 FL (ref 11.5–15)
PDW BLD-RTO: 16.6 FL (ref 11.5–15)
PH BLOOD GAS: 7.15 (ref 7.35–7.45)
PH UA: 5.5 (ref 5–9)
PHOSPHORUS: 2.8 MG/DL (ref 2.5–4.5)
PLATELET # BLD: 184 E9/L (ref 130–450)
PLATELET # BLD: 192 E9/L (ref 130–450)
PMV BLD AUTO: 10.7 FL (ref 7–12)
PMV BLD AUTO: 8.9 FL (ref 7–12)
PO2 ARTERIAL: 212.8 MMHG (ref 60–80)
POIKILOCYTES: ABNORMAL
POLYCHROMASIA: ABNORMAL
POTASSIUM REFLEX MAGNESIUM: 6.1 MMOL/L (ref 3.5–5)
POTASSIUM SERPL-SCNC: 3.9 MMOL/L (ref 3.5–5)
PROMYELOCYTES PERCENT: 0.9 % (ref 0–0)
PROTEIN UA: NEGATIVE MG/DL
PROTHROMBIN TIME: 16.8 SEC (ref 9.3–12.4)
RBC # BLD: 3.96 E12/L (ref 3.8–5.8)
RBC # BLD: 4.17 E12/L (ref 3.8–5.8)
RBC UA: NORMAL /HPF (ref 0–2)
RESPIRATORY SYNCYTIAL VIRUS BY PCR: DETECTED
SODIUM BLD-SCNC: 135 MMOL/L (ref 132–146)
SODIUM BLD-SCNC: 136 MMOL/L (ref 132–146)
SOURCE, BLOOD GAS: ABNORMAL
SPECIFIC GRAVITY UA: 1.02 (ref 1–1.03)
TOTAL PROTEIN: 7.1 G/DL (ref 6.4–8.3)
TROPONIN: 0.05 NG/ML (ref 0–0.03)
UROBILINOGEN, URINE: 2 E.U./DL
WBC # BLD: 16 E9/L (ref 4.5–11.5)
WBC # BLD: 8.6 E9/L (ref 4.5–11.5)
WBC UA: NORMAL /HPF (ref 0–5)

## 2020-01-01 PROCEDURE — 87088 URINE BACTERIA CULTURE: CPT

## 2020-01-01 PROCEDURE — 94640 AIRWAY INHALATION TREATMENT: CPT

## 2020-01-01 PROCEDURE — 2700000000 HC OXYGEN THERAPY PER DAY

## 2020-01-01 PROCEDURE — 2580000003 HC RX 258: Performed by: STUDENT IN AN ORGANIZED HEALTH CARE EDUCATION/TRAINING PROGRAM

## 2020-01-01 PROCEDURE — 80053 COMPREHEN METABOLIC PANEL: CPT

## 2020-01-01 PROCEDURE — 85610 PROTHROMBIN TIME: CPT

## 2020-01-01 PROCEDURE — 71045 X-RAY EXAM CHEST 1 VIEW: CPT

## 2020-01-01 PROCEDURE — 0100U HC RESPIRPTHGN MULT REV TRANS & AMP PRB TECH 21 TRGT: CPT

## 2020-01-01 PROCEDURE — 97165 OT EVAL LOW COMPLEX 30 MIN: CPT

## 2020-01-01 PROCEDURE — 82962 GLUCOSE BLOOD TEST: CPT

## 2020-01-01 PROCEDURE — 80048 BASIC METABOLIC PNL TOTAL CA: CPT

## 2020-01-01 PROCEDURE — 99285 EMERGENCY DEPT VISIT HI MDM: CPT

## 2020-01-01 PROCEDURE — 94660 CPAP INITIATION&MGMT: CPT

## 2020-01-01 PROCEDURE — 97535 SELF CARE MNGMENT TRAINING: CPT

## 2020-01-01 PROCEDURE — 83605 ASSAY OF LACTIC ACID: CPT

## 2020-01-01 PROCEDURE — C9113 INJ PANTOPRAZOLE SODIUM, VIA: HCPCS | Performed by: STUDENT IN AN ORGANIZED HEALTH CARE EDUCATION/TRAINING PROGRAM

## 2020-01-01 PROCEDURE — 93005 ELECTROCARDIOGRAM TRACING: CPT | Performed by: EMERGENCY MEDICINE

## 2020-01-01 PROCEDURE — 82803 BLOOD GASES ANY COMBINATION: CPT

## 2020-01-01 PROCEDURE — 97530 THERAPEUTIC ACTIVITIES: CPT

## 2020-01-01 PROCEDURE — 6370000000 HC RX 637 (ALT 250 FOR IP): Performed by: STUDENT IN AN ORGANIZED HEALTH CARE EDUCATION/TRAINING PROGRAM

## 2020-01-01 PROCEDURE — 6360000002 HC RX W HCPCS: Performed by: STUDENT IN AN ORGANIZED HEALTH CARE EDUCATION/TRAINING PROGRAM

## 2020-01-01 PROCEDURE — 81001 URINALYSIS AUTO W/SCOPE: CPT

## 2020-01-01 PROCEDURE — 84100 ASSAY OF PHOSPHORUS: CPT

## 2020-01-01 PROCEDURE — 2140000000 HC CCU INTERMEDIATE R&B

## 2020-01-01 PROCEDURE — 93010 ELECTROCARDIOGRAM REPORT: CPT | Performed by: INTERNAL MEDICINE

## 2020-01-01 PROCEDURE — 85025 COMPLETE CBC W/AUTO DIFF WBC: CPT

## 2020-01-01 PROCEDURE — 96376 TX/PRO/DX INJ SAME DRUG ADON: CPT

## 2020-01-01 PROCEDURE — 97161 PT EVAL LOW COMPLEX 20 MIN: CPT

## 2020-01-01 PROCEDURE — 2500000003 HC RX 250 WO HCPCS: Performed by: EMERGENCY MEDICINE

## 2020-01-01 PROCEDURE — 36415 COLL VENOUS BLD VENIPUNCTURE: CPT

## 2020-01-01 PROCEDURE — 85027 COMPLETE CBC AUTOMATED: CPT

## 2020-01-01 PROCEDURE — 83690 ASSAY OF LIPASE: CPT

## 2020-01-01 PROCEDURE — 9900000074 HC THERAPEUTIC ACTIVITIES PER 15 MIN (SELF-PAY)

## 2020-01-01 PROCEDURE — 6370000000 HC RX 637 (ALT 250 FOR IP): Performed by: FAMILY MEDICINE

## 2020-01-01 PROCEDURE — 84484 ASSAY OF TROPONIN QUANT: CPT

## 2020-01-01 PROCEDURE — 85730 THROMBOPLASTIN TIME PARTIAL: CPT

## 2020-01-01 PROCEDURE — 83735 ASSAY OF MAGNESIUM: CPT

## 2020-01-01 PROCEDURE — 6360000002 HC RX W HCPCS: Performed by: EMERGENCY MEDICINE

## 2020-01-01 PROCEDURE — 96374 THER/PROPH/DIAG INJ IV PUSH: CPT

## 2020-01-01 PROCEDURE — 82150 ASSAY OF AMYLASE: CPT

## 2020-01-01 PROCEDURE — 87040 BLOOD CULTURE FOR BACTERIA: CPT

## 2020-01-01 PROCEDURE — 6370000000 HC RX 637 (ALT 250 FOR IP): Performed by: INTERNAL MEDICINE

## 2020-01-01 RX ORDER — POLYETHYLENE GLYCOL 3350 17 G/17G
17 POWDER, FOR SOLUTION ORAL DAILY PRN
Qty: 527 G | Refills: 1 | Status: SHIPPED | OUTPATIENT
Start: 2020-01-01 | End: 2020-02-02

## 2020-01-01 RX ORDER — METOPROLOL TARTRATE 50 MG/1
75 TABLET, FILM COATED ORAL 2 TIMES DAILY
Status: DISCONTINUED | OUTPATIENT
Start: 2020-01-01 | End: 2020-01-01 | Stop reason: HOSPADM

## 2020-01-01 RX ORDER — MORPHINE SULFATE 4 MG/ML
4 INJECTION, SOLUTION INTRAMUSCULAR; INTRAVENOUS ONCE
Status: DISCONTINUED | OUTPATIENT
Start: 2020-01-01 | End: 2020-01-01 | Stop reason: HOSPADM

## 2020-01-01 RX ORDER — PANTOPRAZOLE SODIUM 40 MG/1
40 TABLET, DELAYED RELEASE ORAL
Qty: 30 TABLET | Refills: 5 | Status: SHIPPED | OUTPATIENT
Start: 2020-01-01

## 2020-01-01 RX ORDER — METOPROLOL TARTRATE 75 MG/1
75 TABLET, FILM COATED ORAL 2 TIMES DAILY
Qty: 60 TABLET | Refills: 3 | Status: SHIPPED | OUTPATIENT
Start: 2020-01-01

## 2020-01-01 RX ORDER — IPRATROPIUM BROMIDE AND ALBUTEROL SULFATE 2.5; .5 MG/3ML; MG/3ML
3 SOLUTION RESPIRATORY (INHALATION) 4 TIMES DAILY
Qty: 360 ML | Refills: 0 | OUTPATIENT
Start: 2020-01-01

## 2020-01-01 RX ORDER — LORAZEPAM 2 MG/ML
1 INJECTION INTRAMUSCULAR ONCE
Status: DISCONTINUED | OUTPATIENT
Start: 2020-01-01 | End: 2020-01-01 | Stop reason: HOSPADM

## 2020-01-01 RX ORDER — METOPROLOL TARTRATE 75 MG/1
75 TABLET, FILM COATED ORAL 2 TIMES DAILY
Qty: 60 TABLET | Refills: 3 | OUTPATIENT
Start: 2020-01-01

## 2020-01-01 RX ORDER — FUROSEMIDE 10 MG/ML
40 INJECTION INTRAMUSCULAR; INTRAVENOUS ONCE
Status: DISCONTINUED | OUTPATIENT
Start: 2020-01-01 | End: 2020-01-01

## 2020-01-01 RX ORDER — ETOMIDATE 2 MG/ML
INJECTION INTRAVENOUS
Status: DISCONTINUED
Start: 2020-01-01 | End: 2020-01-01 | Stop reason: WASHOUT

## 2020-01-01 RX ORDER — DILTIAZEM HYDROCHLORIDE 5 MG/ML
10 INJECTION INTRAVENOUS ONCE
Status: COMPLETED | OUTPATIENT
Start: 2020-01-01 | End: 2020-01-01

## 2020-01-01 RX ADMIN — IPRATROPIUM BROMIDE AND ALBUTEROL SULFATE 1 AMPULE: 2.5; .5 SOLUTION RESPIRATORY (INHALATION) at 08:47

## 2020-01-01 RX ADMIN — IPRATROPIUM BROMIDE AND ALBUTEROL SULFATE 1 AMPULE: 2.5; .5 SOLUTION RESPIRATORY (INHALATION) at 12:27

## 2020-01-01 RX ADMIN — METOPROLOL TARTRATE 75 MG: 50 TABLET, FILM COATED ORAL at 10:33

## 2020-01-01 RX ADMIN — IPRATROPIUM BROMIDE AND ALBUTEROL SULFATE 1 AMPULE: 2.5; .5 SOLUTION RESPIRATORY (INHALATION) at 12:48

## 2020-01-01 RX ADMIN — IPRATROPIUM BROMIDE AND ALBUTEROL SULFATE 1 AMPULE: 2.5; .5 SOLUTION RESPIRATORY (INHALATION) at 08:36

## 2020-01-01 RX ADMIN — PANTOPRAZOLE SODIUM 40 MG: 40 INJECTION, POWDER, FOR SOLUTION INTRAVENOUS at 11:44

## 2020-01-01 RX ADMIN — DILTIAZEM HYDROCHLORIDE 10 MG: 5 INJECTION INTRAVENOUS at 11:42

## 2020-01-01 RX ADMIN — SODIUM CHLORIDE, PRESERVATIVE FREE 10 ML: 5 INJECTION INTRAVENOUS at 10:05

## 2020-01-01 RX ADMIN — DOCUSATE SODIUM 100 MG: 100 CAPSULE, LIQUID FILLED ORAL at 10:34

## 2020-01-01 RX ADMIN — DOCUSATE SODIUM 100 MG: 100 CAPSULE, LIQUID FILLED ORAL at 11:45

## 2020-01-01 RX ADMIN — IPRATROPIUM BROMIDE AND ALBUTEROL SULFATE 1 AMPULE: 2.5; .5 SOLUTION RESPIRATORY (INHALATION) at 16:40

## 2020-01-01 RX ADMIN — IPRATROPIUM BROMIDE AND ALBUTEROL SULFATE 1 AMPULE: 2.5; .5 SOLUTION RESPIRATORY (INHALATION) at 19:26

## 2020-01-01 RX ADMIN — IPRATROPIUM BROMIDE AND ALBUTEROL SULFATE 1 AMPULE: 2.5; .5 SOLUTION RESPIRATORY (INHALATION) at 07:58

## 2020-01-01 RX ADMIN — METOPROLOL TARTRATE 50 MG: 50 TABLET, FILM COATED ORAL at 22:07

## 2020-01-01 RX ADMIN — DILTIAZEM HYDROCHLORIDE 10 MG: 5 INJECTION INTRAVENOUS at 10:55

## 2020-01-01 RX ADMIN — SODIUM CHLORIDE, PRESERVATIVE FREE 10 ML: 5 INJECTION INTRAVENOUS at 21:36

## 2020-01-01 RX ADMIN — IPRATROPIUM BROMIDE AND ALBUTEROL SULFATE 1 AMPULE: 2.5; .5 SOLUTION RESPIRATORY (INHALATION) at 11:38

## 2020-01-01 RX ADMIN — SODIUM CHLORIDE, PRESERVATIVE FREE 10 ML: 5 INJECTION INTRAVENOUS at 11:45

## 2020-01-01 RX ADMIN — IPRATROPIUM BROMIDE AND ALBUTEROL SULFATE 1 AMPULE: 2.5; .5 SOLUTION RESPIRATORY (INHALATION) at 15:48

## 2020-01-01 RX ADMIN — METOPROLOL TARTRATE 75 MG: 50 TABLET, FILM COATED ORAL at 11:44

## 2020-01-01 RX ADMIN — SODIUM CHLORIDE, PRESERVATIVE FREE 10 ML: 5 INJECTION INTRAVENOUS at 11:44

## 2020-01-01 RX ADMIN — SODIUM CHLORIDE, PRESERVATIVE FREE 10 ML: 5 INJECTION INTRAVENOUS at 10:34

## 2020-01-01 RX ADMIN — PANTOPRAZOLE SODIUM 40 MG: 40 INJECTION, POWDER, FOR SOLUTION INTRAVENOUS at 10:34

## 2020-01-01 RX ADMIN — METOPROLOL TARTRATE 50 MG: 50 TABLET, FILM COATED ORAL at 10:04

## 2020-01-01 RX ADMIN — SODIUM CHLORIDE, PRESERVATIVE FREE 10 ML: 5 INJECTION INTRAVENOUS at 22:07

## 2020-01-01 RX ADMIN — METOPROLOL TARTRATE 75 MG: 50 TABLET, FILM COATED ORAL at 21:36

## 2020-01-01 RX ADMIN — IPRATROPIUM BROMIDE AND ALBUTEROL SULFATE 1 AMPULE: 2.5; .5 SOLUTION RESPIRATORY (INHALATION) at 19:34

## 2020-01-01 RX ADMIN — PANTOPRAZOLE SODIUM 40 MG: 40 INJECTION, POWDER, FOR SOLUTION INTRAVENOUS at 10:05

## 2020-01-01 ASSESSMENT — PAIN SCALES - GENERAL
PAINLEVEL_OUTOF10: 0

## 2020-01-01 NOTE — PROGRESS NOTES
ADLS and ROM as stated above. Multiple rest breaks required to focus on breathing techniques. HR ranged between 135-137 bpm; SpO2 92-93%. Attempted x1 STS max A x2 but unable to achieve full stand. Increased anxiety and co dizziness. Assisted back into supine; RN present and assisted with toileting hygiene and changing linens. Skilled monitoring of HR, O2 sats and pts response to treatment. At end of session, patient left with RN who was still assisting with toileting hygiene. During functional activites and ADLs pt educated on proper hand placement, safety technique, sequencing, posture and energy conservation/pacing/breathing techniques. Pt additionally educated on OT POC, OT role, importance of EOB/OOB activity and completing ADL tasks daily as IND as possible to aide in recovery process, fall risk precautions/call light use. Pt demonstrating F+ understanding of education/techniques. Eval Complexity:   · Low Complexity  · History: Limited review of medical records and additional review of physical, cognitive, or psychosocial history related to current functional performance  · Exam: 3+ performance deficits  · Assistance/Modification: Min/mod assistance or modifications required to perform tasks. May have comorbidities that affect occupational performance.     Assessment of current deficits   Functional mobility [x]  ADLs [x] Strength [x]  Cognition []  Functional transfers  [x] IADLs [x] Safety Awareness [x]  Endurance [x]  Fine Motor Coordination [] Balance [x] Vision/perception [] Sensation []   Gross Motor Coordination [] ROM [] Delirium []                  Motor Control []    Plan of Care:   ADL retraining [x]   Equipment needs [x]   Neuromuscular re-education [x] Energy Conservation Techniques [x]  Functional Transfer training [x] Patient and/or Family Education [x]  Functional Mobility training [x]  Environmental Modifications [x]  Cognitive re-training [x]   Compensatory techniques for ADLs

## 2020-01-01 NOTE — PROGRESS NOTES
metoprolol tartrate (LOPRESSOR) tablet 50 mg, 50 mg, Oral, BID, Estephania Ray MD, 50 mg at 01/01/20 1004    sodium chloride flush 0.9 % injection 10 mL, 10 mL, Intravenous, 2 times per day, Andrews Andrade MD, 10 mL at 01/01/20 1005    sodium chloride flush 0.9 % injection 10 mL, 10 mL, Intravenous, PRN, Andrews Andrade MD    magnesium hydroxide (MILK OF MAGNESIA) 400 MG/5ML suspension 30 mL, 30 mL, Oral, Daily PRN, Andrews Andrade MD    ondansetron [facility]) injection 4 mg, 4 mg, Intravenous, Q6H PRN, Andrews Andrade MD    pantoprazole (PROTONIX) injection 40 mg, 40 mg, Intravenous, Daily, 40 mg at 01/01/20 1005 **AND** sodium chloride (PF) 0.9 % injection 10 mL, 10 mL, Intravenous, Daily, Andrews Andrade MD, 10 mL at 01/01/20 1005    ipratropium-albuterol (DUONEB) nebulizer solution 1 ampule, 1 ampule, Inhalation, 4x daily, Andrews Andrade MD, 1 ampule at 01/01/20 1138    acetaminophen (TYLENOL) tablet 650 mg, 650 mg, Oral, Q4H PRN, Andrews Andrade MD, 650 mg at 12/31/19 2214    polyethylene glycol (GLYCOLAX) packet 17 g, 17 g, Oral, Daily PRN, Andrews Andrade MD    glucose (GLUTOSE) 40 % oral gel 15 g, 15 g, Oral, PRN, Andrews Andrade MD    dextrose 50 % IV solution, 12.5 g, Intravenous, PRN, Andrews Andrade MD    glucagon (rDNA) injection 1 mg, 1 mg, Intramuscular, PRN, Andrews Andrade MD    dextrose 5 % solution, 100 mL/hr, Intravenous, PRN, Andrews Andrade MD    Apremilast TABS 30 mg, 30 mg, Oral, Daily, Andrews Andrade MD, 30 mg at 01/01/20 1004    docusate sodium (COLACE) capsule 100 mg, 100 mg, Oral, Daily, Andrews Andrade MD, 100 mg at 12/29/19 1102    Assessment:    Active Problems:    Bilateral pulmonary embolism ([address]    Palliative care by specialist  Resolved Problems:    * No resolved hospital problems.  *    72 year old male, patient of Dr. Emily Castillo with stage IV Malignant melanoma, originally diagnosed as a stage IIIC (T3b N1a) sp wide local excision on 2/12/18. BRAF negative. Treated with adjuvant Nivolumab on 3/21/18 to 9/18/18. Biopsy proven locally recurrent disease from R groin LN dissection on 10/24/18 with 2 of 10 LN's positive for malignant melanoma. Started Willadean Crank on 12/11/18. 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 He had MRI brain done in [place] on 12/16/19 that revealed response with decrease in CNS lesion. He was to see Dr. Tahir Hernandez for evaluation of his proximal weakness and tremor. These symptoms were not consistent with his most recent MRI findings from [place]. He last dose of Keytruda was on 12/17/19. He was admitted for bilateral pulmonary embolism. He is not a candidate for   anticoagulation due to his brain metastasis. Vascular surgery was consulted. - On 12/28, patient had IVC filter placed    Plan:  He is not a good anticoagulation candidate due to CNS metastasis and has undergone IVC placement. He has a baseline sinus tachycardia and may require further adjustment of Lopressor given recent tachycardic episodes. He will need inpatient rehabilitation since his mobility has decreased since hospitalization. Will need outpatient sleep study to evaluate for ABIODUN. Will plan to continue outpatient therapy once acute issues resolve.     Daily CBC, CMP, counts are stable  Patient will follow up at the [facility] upon discharge to continue outpatient treatment.       Electronically signed by Agata Jimenez MD on 1/1/2020 at 1:08 PM

## 2020-01-01 NOTE — PROGRESS NOTES
Physical Therapy    Facility/Department: [facility]  Initial Assessment    NAME: Elgie Bumpers  : 1950  MRN: [id number]    Date of Service: 2020    Evaluating Therapist: Nathalia Mccarty PT, DPT      Room #: 3144 A  Diagnosis: B PE's  Precautions: falls, 4L O2  Procedure: 2019 IVC filter  Other pertinent history: metastatic malignant melanoma, brain mets    Pt lives with his wife in a 1 story home with a ramp to enter. Pt ambulated with no AD in the house and used [employer] for longer distances. Current DME: SPC, WW, WC   DME needed at discharge: TBA     Alert and oriented x 3  Sensation:  denies numbness/tingling to all extremities  Edema:  Severe edema noted in CHIP SIFUENTES's     Initial Evaluation  Date: 2020 Treatment  Date:     Short Term/ Long Term   Goals   AM-PAC 6 Clicks      Was pt agreeable to Eval/treatment? Yes      Does pt have pain? Pt c/o pain in CHIP SIFUENTES's. Bed Mobility  Rolling: max A x2  Supine to sit: max A x2  Sit to supine: max A x2  Scooting: max A x2  Rolling: min A  Supine to sit: min A  Sit to supine: min A  Scooting: min A   Transfers Sit to stand: max A x2 (partial stand only)  Stand to sit: max A x2  Stand pivot: NT  Sit to stand: min A  Stand to sit: min A  Stand pivot: min A   Ambulation    TBA  >50' with [employer] min A   Stair negotiation: ascended and descended NA  NA   ROM B LE AAROM WFL     Strength B LE strength grossly 2/5 to 2+/5  Increase B LE strength to at least 3+/5 throughout   Balance Sitting EOB: varied between min A initially to SBA   Dynamic Standing: NT  Sitting EOB: supervision    Dynamic Standing: min A     Comments: Pt given verbal cues for technique with bed mobility. Once sitting at EOB, pt's SpO2 on 4L O2 was 92-93% and heart rate was between 135-137 bpm.  Pt given verbal cues for deep breathing while seated due to SOB. Pt given verbal cues for hand placement with transfers.   Attempted sit>stand however pt unable to coming to standing position and

## 2020-01-01 NOTE — PROGRESS NOTES
Admit Date: 12/27/2019      Subjective:     Patient: no acute issues reported overnight  Medication side effects: none    Scheduled Meds:   mometasone-formoterol  2 puff Inhalation BID    metoprolol tartrate  50 mg Oral BID    sodium chloride flush  10 mL Intravenous 2 times per day    pantoprazole  40 mg Intravenous Daily    And    sodium chloride (PF)  10 mL Intravenous Daily    ipratropium-albuterol  1 ampule Inhalation 4x daily    Apremilast  30 mg Oral Daily    docusate sodium  100 mg Oral Daily     Continuous Infusions:   dextrose       PRN Meds:sodium chloride flush, magnesium hydroxide, ondansetron, acetaminophen, polyethylene glycol, glucose, dextrose, glucagon (rDNA), dextrose    Objective:   I/O last 3 completed shifts:   In: 400 [P.O.:400]  Out: 450 [Urine:450]  I/O this shift:  In: 18 [I.V.:18]  Out: -     /83   Pulse 115   Temp 97.9 °F (36.6 °C) (Temporal)   Resp 18   Ht 5' 10\" (1.778 m)   Wt (!) 312 lb (141.5 kg)   SpO2 93%   BMI 44.77 kg/m²   General appearance: alert, appears stated age and cooperative  Skin:negative  Heent:negative  Lungs: clear to auscultation bilaterally  Heart: irregularly irregular rhythm  Abdomen: soft, non-tender; bowel sounds normal; no masses,  no organomegaly  Extremities:no edema  Neurologic: Grossly normal    Labs:  CBC with Differential:    Lab Results   Component Value Date    WBC 8.6 01/01/2020    RBC 3.96 01/01/2020    HGB 11.8 01/01/2020    HCT 38.1 01/01/2020     01/01/2020    MCV 96.2 01/01/2020    MCH 29.8 01/01/2020    MCHC 31.0 01/01/2020    RDW 16.2 01/01/2020    SEGSPCT 73 02/28/2014    LYMPHOPCT 12.2 12/27/2019    MONOPCT 4.4 12/27/2019    BASOPCT 0.7 12/27/2019    MONOSABS 0.30 12/27/2019    LYMPHSABS 0.83 12/27/2019    EOSABS 0.06 12/27/2019    BASOSABS 0.05 12/27/2019     BMP:    Lab Results   Component Value Date     01/01/2020    K 3.9 01/01/2020    K 3.7 12/27/2019     01/01/2020    CO2 21 01/01/2020    BUN 18 01/01/2020    LABALBU 3.3 12/27/2019    CREATININE 0.9 01/01/2020    CALCIUM 8.1 01/01/2020    GFRAA >60 01/01/2020    LABGLOM >60 01/01/2020     PT/INR:    Lab Results   Component Value Date    PROTIME 13.4 12/31/2019    INR 1.2 12/31/2019     Last 3 Troponin:    Lab Results   Component Value Date    TROPONINI <0.01 12/28/2019    TROPONINI <0.01 12/27/2019    TROPONINI <0.01 12/27/2019     TSH:    Lab Results   Component Value Date    TSH 3.680 03/03/2014      Assessment:     1. Bilateral pulmonary embolism s/p IVC filter  2. Chronic atrial fibrillation  3. CAD  4. Generalized muscle weakness  5.  Metastatic malignant melanoma of the skin to the brain and lymph nodes      Plan:       Continue same plan and orders    Needs PT/OT eval, orders placed several days ago, to assist with dc planning and transition of care

## 2020-01-02 NOTE — DISCHARGE INSTR - COC
on oxygen at 3 L/min per nasal cannula. (previous to hospitalization, the patient was not on oxygen  Ventilator:    - No ventilator support    Rehab Therapies: Physical Therapy and Occupational Therapy  Weight Bearing Status/Restrictions: No weight bearing restirctions  Other Medical Equipment (for information only, NOT a DME order):  wheelchair, walker and hospital bed  Other Treatments: n/a    Patient's personal belongings (please select all that are sent with patient):  None    RN SIGNATURE:  Electronically signed by Vlad Blake RN on 1/3/20 at 2:26 PM    CASE MANAGEMENT/SOCIAL WORK SECTION    Inpatient Status Date: ***    Readmission Risk Assessment Score:  Readmission Risk              Risk of Unplanned Readmission:        9           Discharging to Facility/ Agency   · Name: [facility]ION  · Address:  · Phone:  · Fax:    Dialysis Facility (if applicable)   · Name:  · Address:  · Dialysis Schedule:  · Phone:  · Fax:    / signature: . Electronically signed by YARITZA Perkins on 1/2/2020 at 3:57 PM        PHYSICIAN SECTION    Prognosis: {Prognosis:3485963894}    Condition at Discharge: [facility] Patient Condition:[id number]}    Rehab Potential (if transferring to Rehab): {Prognosis:9586770084}    Recommended Labs or Other Treatments After Discharge: ***    Physician Certification: I certify the above information and transfer of Brayan Love  is necessary for the continuing treatment of the diagnosis listed and that he requires [facility] for less 30 days.      Update Admission H&P: {[facility] DME Changes in EGGJI:215532354}    PHYSICIAN SIGNATURE:  Sadia Anderson DO

## 2020-01-02 NOTE — CARE COORDINATION
SOCIAL WORK/DISCHARGE PLANNING;  Received call from pt's wife. Discussed tono. She has reviewed list and first choice is [facility]. Her second choice is [facility]. [address] [facility] liaison and left message re; referral. Will await return call. Alla VIGIL    Referral also made to [facility]. Notified by [address] [place] [address] that they are not able to accept pt at this time due to his weight. [facility] accepted pt and will have a bed tomorrow 01/03/2020, if medically ready. Informed pt and wife. Completed hospital exempt form and placed in chart.   Alla VIGIL

## 2020-01-02 NOTE — PROGRESS NOTES
room.  Pt required increased time d/t sensitivity on R LE and SOB throughout session. Pt's O2 sat remained above 89% with 4L O2 after exertion;HR below 120 throughout session. Pt had difficulty with stand pivot d/t being sedentary from hospital stay. Returned pt to bedside chair with wife present. Educated pt on breathing techniques d/t SOB. Pt call light left by patient. Time in: 0451  Time out: 1500    Pt is making limited progress toward established Physical Therapy goals. Continue with physical therapy current plan of care.     [facility]  License Number: [id number]

## 2020-01-02 NOTE — PROGRESS NOTES
Neurosurg progress note  VITALS:  /82   Pulse 119   Temp 98 °F (36.7 °C) (Tympanic)   Resp 18   Ht 5' 10\" (1.778 m)   Wt (!) 307 lb (139.3 kg)   SpO2 93%   BMI 44.05 kg/m²   24HR INTAKE/OUTPUT:    Intake/Output Summary (Last 24 hours) at 2020 1112  Last data filed at 2020 6230  Gross per 24 hour   Intake 1100 ml   Output 1100 ml   Net 0 ml     Ct Head W Wo Contrast    Result Date: 2019  Patient MRN:  [id number] : 1950 Age: 77 years Gender: Male Order Date:  2019 11:30 AM EXAM: CT HEAD W WO CONTRAST NUMBER OF IMAGES:  299 INDICATION:  increased weakness in legs, dizziness, lightheadedness increased weakness in legs, dizziness, lightheadedness COMPARISON: None Technique: Low-dose CT  acquisition technique included one of following options; 1 . Automated exposure control, 2. Adjustment of MA and or KV according to patient's size or 3. Use of iterative reconstruction. Multiple CT sections were obtained with sagittal and coronal MPR reconstructions. The ventricles are prominent. The gyri and sulci appear  prominent. The white matter appears  prominent. There is no evidence for hemorrhage. There is no infarct identified. There is mass effect in the right frontal lobe following administration contrast there is an enhancing mass. The mass measures 2.6 x 1.8 cm. Diffuse atrophy likely age related Findings compatible with small vessel ischemic changes. Mass in the left frontal lobe, the differential would include metastatic disease, versus primary neoplasm such as a glioma. Given the patient's history of melanoma, metastatic melanoma could give this appearance ALERT:  THIS IS AN ABNORMAL REPORT     Cta Chest W Contrast    Addendum Date: 2019    Pleural-based nodular density in the left lung base. Consider surveillance.     Result Date: 2019  Patient MRN:  [id number] : 1950 Age: 77 years Gender: Male Order Date:  2019 9:15 AM EXAM: CTA CHEST W CONTRAST number of images 545 including MIP coronal and sagittal reconstruction images. Contrast. Isovue-370, 100 mL intravenously. Technique: Low-dose CT  acquisition technique included one of following options; 1 . Automated exposure control, 2. Adjustment of MA and or KV according to patient's size or 3. Use of iterative reconstruction. INDICATION:  r/o PE, hx of cancer, SOB/weak/lightheaded r/o PE, hx of cancer, SOB/weak/lightheaded COMPARISON: None FINDINGS: There is borderline cardiac size. There is aneurysm of the ascending thoracic aorta measuring 4.3 x 4.3 cm. There is also mild aneurysm of the abdominal aorta measuring 3.9 cm. There is coronary artery calcification. Small to moderate lymph nodes are identified in the mediastinum with lymph nodes measuring up to 1.2 cm in the AP window and similar 1's in the prevascular space and smaller 1's in the paratracheal region. There are filling defects in the right upper lobe and right lower lobe pulmonary arteries and small filling defect in the segmental branches of left upper lobe concerning for bilateral pulmonary embolism. There is minimal atelectasis/infiltrates in the lung bases bilaterally more on the right side. Some pleural-based nodular density in the left costophrenic angle is noted. The liver is of normal architecture. Gallbladder is distended. Degenerative changes are identified in the thoracic spine. Bilateral pulmonary embolism with involvement of the right upper lobe and right lower lobe and segmental branches of left upper lobe. Minimal atelectasis/infiltrates and pleural effusions in the lung bases. Aortic aneurysm with involvement of the ascending thoracic aorta and the abdominal aorta. Surveillance is recommended. The above findings were telephoned to the ED physician.  ALERT:  THIS IS AN ABNORMAL REPORT     Us Dup Upper Extremities Bilateral Venous    Result Date: 2019  Patient MRN:  [id number] : 1950 Age: 77 years Gender: Male Order Date: 2019 7:00 PM EXAM: US DUP UPPER EXTREMITIES BILATERAL VENOUS NUMBER OF IMAGES:  44 INDICATION:  DVT evaluation DVT evaluation COMPARISON: None TECHNIQUE: Lauro Ax scale as well as color and spectral duplex sonography of the bilateral upper extremity deep venous systems was performed. FINDINGS: There is normal compression, flow, and augmentation response in the bilateral internal jugular, subclavian, axillary, brachial, basilic, cephalic, ulnar and radial veins as seen. No sonographic evidence of deep venous thrombosis in the bilateral upper extremities as seen. Us Dup Lower Extremity Left Guillermo    Result Date: 2019  Patient MRN:  [id number] : 1950 Age: 77 years Gender: Male Order Date:  2019 9:15 AM EXAM: US DUP LOWER EXTREMITY LEFT GUILLERMO NUMBER OF IMAGES:  28 INDICATION:  dvt dvt COMPARISON: None There is no evidence for deep venous thrombosis There is good compressibility, there is good augmentation, there is good color flow. No evidence for deep venous thrombosis     Fluoro For Surgical Procedures    Result Date: 2019  Patient MRN: [id number] : 1950 Age:  77 years Gender: Male Order Date: 2019 3:15 PM Exam: FLUORO FOR SURGICAL PROCEDURES Number of Images: 1 Indication:   vena cava filter Comparison: None. Fluoroscopy time: 76.5 seconds Findings: On the single submitted image, an inferior vena cava filter and its deployment catheter are visualized. Intraoperative fluoroscopy for inferior vena cava filter placement. The study was dictated by Naina Servin PA-C and Liliana Gonzalez MD reviewed and concurred with the findings.     CBC:   Lab Results   Component Value Date    WBC 8.6 2020    RBC 3.96 2020    HGB 11.8 2020    HCT 38.1 2020    MCV 96.2 2020    MCH 29.8 2020    MCHC 31.0 2020    RDW 16.2 2020     2020    MPV 8.9 2020     BMP:    Lab Results   Component Value Date     2020    K 3.9 01/01/2020    K 3.7 12/27/2019     01/01/2020    CO2 21 01/01/2020    BUN 18 01/01/2020    LABALBU 3.3 12/27/2019    CREATININE 0.9 01/01/2020    CALCIUM 8.1 01/01/2020    GFRAA >60 01/01/2020    LABGLOM >60 01/01/2020    GLUCOSE 99 01/01/2020      metoprolol tartrate  75 mg Oral BID    mometasone-formoterol  2 puff Inhalation BID    sodium chloride flush  10 mL Intravenous 2 times per day    pantoprazole  40 mg Intravenous Daily    And    sodium chloride (PF)  10 mL Intravenous Daily    ipratropium-albuterol  1 ampule Inhalation 4x daily    Apremilast  30 mg Oral Daily    docusate sodium  100 mg Oral Daily     Awake and alert, perrl eomi speech fluent follows commands   Assessment:  Patient Active Problem List   Diagnosis    Ulnar neuropathy at elbow    Superficial incisional surgical site infection    Bilateral pulmonary embolism ([address]    Palliative care by specialist     Plan:Continue current care  Selvin Copeland M.D.

## 2020-01-02 NOTE — PROGRESS NOTES
Subjective: The patient is awake and alert in bed. He denies pain. He is hoping to be discharged to a rehab facility today. No problems overnight. Denies chest pain, angina, dyspnea or abdominal discomfort. No fevers or chills. No bleeding or bruising. No nausea or vomiting. Tolerating diet. Objective:    /82   Pulse 119   Temp 98 °F (36.7 °C) (Tympanic)   Resp 18   Ht 5' 10\" (1.778 m)   Wt (!) 307 lb (139.3 kg)   SpO2 93%   BMI 44.05 kg/m²     General: NAD  HEENT: No thrush or mucositis, EOMI, PERRLA  Heart:  RRR, no murmurs, gallops, or rubs.   Lungs:  CTA bilaterally, no wheeze, rales or rhonchi  Abd: bowel sounds present, nontender, nondistended, no masses  Extrem:  +4 pitting BLE edema, R>L,  no clubbing or cyanosis  Lymphatics: No palpable adenopathy in cervical and supraclavicular regions  Skin: Intact, no petechia or purpura    CBC with Differential:    Lab Results   Component Value Date    WBC 8.6 01/01/2020    RBC 3.96 01/01/2020    HGB 11.8 01/01/2020    HCT 38.1 01/01/2020     01/01/2020    MCV 96.2 01/01/2020    MCH 29.8 01/01/2020    MCHC 31.0 01/01/2020    RDW 16.2 01/01/2020    SEGSPCT 73 02/28/2014    LYMPHOPCT 12.2 12/27/2019    MONOPCT 4.4 12/27/2019    BASOPCT 0.7 12/27/2019    MONOSABS 0.30 12/27/2019    LYMPHSABS 0.83 12/27/2019    EOSABS 0.06 12/27/2019    BASOSABS 0.05 12/27/2019     CMP:    Lab Results   Component Value Date     01/01/2020    K 3.9 01/01/2020    K 3.7 12/27/2019     01/01/2020    CO2 21 01/01/2020    BUN 18 01/01/2020    CREATININE 0.9 01/01/2020    GFRAA >60 01/01/2020    LABGLOM >60 01/01/2020    GLUCOSE 99 01/01/2020    PROT 5.5 12/27/2019    LABALBU 3.3 12/27/2019    CALCIUM 8.1 01/01/2020    BILITOT 0.4 12/27/2019    ALKPHOS 98 12/27/2019    AST 17 12/27/2019    ALT 26 12/27/2019         Current Facility-Administered Medications:     metoprolol tartrate (LOPRESSOR) tablet 75 mg, 75 mg, Oral, BID, Sadia Anderson DO   sp wide local excision on 2/12/18. BRAF negative. Treated with adjuvant Nivolumab on 3/21/18 to 9/18/18. Biopsy proven locally recurrent disease from R groin LN dissection on 10/24/18 with 2 of 10 LN's positive for malignant melanoma. Started Tiney Wendi on 12/11/18. He had CNS metastasis to the frontal lobe diagnosed on 9/20/19 s/p gamma knife RT x 3 fractions from 10/18/19 - 10/30/19. He had MRI brain done in [address] [place] [address] 12/16/19 that revealed response with decrease in CNS lesion. He was to see Dr. Julieth Fajardo for evaluation of his proximal weakness and tremor.  These symptoms were not consistent with his most recent MRI findings from [facility] last dose of Tiney Wendi was on 12/17/19. He was admitted for bilateral pulmonary embolism.  He is not a candidate for   anticoagulation due to his brain metastasis.   Vascular surgery was consulted. - On 12/28, patient had IVC filter placed    Plan:  Continue with PT/OT  Patient will be discharged to [facility] upon discharge  Lopressor was increased per primary for tachycardia   Will plan to continue outpatient therapy once acute issues resolve. Daily CBC, CMP, counts are stable  Patient will follow up at the [facility] upon discharge to continue outpatient treatment.         Electronically signed by LUCRECIA Ornelas NP on 1/2/2020 at 8:03 AM

## 2020-01-02 NOTE — PROGRESS NOTES
Admit Date: 12/27/2019      Subjective:     Patient: episode of tachycardia noted yesterday after working with PT/OT  Medication side effects: none    Scheduled Meds:   mometasone-formoterol  2 puff Inhalation BID    metoprolol tartrate  50 mg Oral BID    sodium chloride flush  10 mL Intravenous 2 times per day    pantoprazole  40 mg Intravenous Daily    And    sodium chloride (PF)  10 mL Intravenous Daily    ipratropium-albuterol  1 ampule Inhalation 4x daily    Apremilast  30 mg Oral Daily    docusate sodium  100 mg Oral Daily     Continuous Infusions:   dextrose       PRN Meds:sodium chloride flush, magnesium hydroxide, ondansetron, acetaminophen, polyethylene glycol, glucose, dextrose, glucagon (rDNA), dextrose    Objective:   I/O last 3 completed shifts: In: 558 [P.O.:530; I.V.:28]  Out: 600 [Urine:600]  I/O this shift:   In: 56 [P.O.:480; I.V.:10]  Out: 300 [Urine:300]    /82   Pulse 119   Temp 98 °F (36.7 °C) (Tympanic)   Resp 18   Ht 5' 10\" (1.778 m)   Wt (!) 307 lb (139.3 kg)   SpO2 93%   BMI 44.05 kg/m²   General appearance: alert, appears stated age and cooperative  Skin:negative  Heent:negative  Lungs: clear to auscultation bilaterally  Heart: regular rate and rhythm, S1, S2 normal, no murmur, click, rub or gallop  Abdomen: soft, non-tender; bowel sounds normal; no masses,  no organomegaly  Extremities:no edema  Neurologic: Grossly normal    Labs:  CBC with Differential:    Lab Results   Component Value Date    WBC 8.6 01/01/2020    RBC 3.96 01/01/2020    HGB 11.8 01/01/2020    HCT 38.1 01/01/2020     01/01/2020    MCV 96.2 01/01/2020    MCH 29.8 01/01/2020    MCHC 31.0 01/01/2020    RDW 16.2 01/01/2020    SEGSPCT 73 02/28/2014    LYMPHOPCT 12.2 12/27/2019    MONOPCT 4.4 12/27/2019    BASOPCT 0.7 12/27/2019    MONOSABS 0.30 12/27/2019    LYMPHSABS 0.83 12/27/2019    EOSABS 0.06 12/27/2019    BASOSABS 0.05 12/27/2019     BMP:    Lab Results   Component Value Date

## 2020-01-03 NOTE — PROGRESS NOTES
Subjective: The patient is awake and alert in bed, currently receiving a breathing treatment. No problems overnight. Denies chest pain, angina, dyspnea or abdominal discomfort. No nausea or vomiting. No fevers or chills. No bleeding or bruising. Tolerating diet. Objective:    /88   Pulse 115   Temp 98.6 °F (37 °C) (Temporal)   Resp 16   Ht 5' 10\" (1.778 m)   Wt (!) 306 lb 8 oz (139 kg)   SpO2 93%   BMI 43.98 kg/m²     General: NAD  HEENT: No thrush or mucositis, EOMI, PERRLA  Heart:  RRR, no murmurs, gallops, or rubs.   Lungs:  Expiratory wheezing bilaterally, no rales or rhonchi  Abd: bowel sounds present, nontender, nondistended, no masses  Extrem: +3 BLE pitting edema R>L,  no clubbing or cyanosis  Lymphatics: No palpable adenopathy in cervical and supraclavicular regions  Skin: Intact, no petechia or purpura    CBC with Differential:    Lab Results   Component Value Date    WBC 8.6 01/01/2020    RBC 3.96 01/01/2020    HGB 11.8 01/01/2020    HCT 38.1 01/01/2020     01/01/2020    MCV 96.2 01/01/2020    MCH 29.8 01/01/2020    MCHC 31.0 01/01/2020    RDW 16.2 01/01/2020    SEGSPCT 73 02/28/2014    LYMPHOPCT 12.2 12/27/2019    MONOPCT 4.4 12/27/2019    BASOPCT 0.7 12/27/2019    MONOSABS 0.30 12/27/2019    LYMPHSABS 0.83 12/27/2019    EOSABS 0.06 12/27/2019    BASOSABS 0.05 12/27/2019     CMP:    Lab Results   Component Value Date     01/01/2020    K 3.9 01/01/2020    K 3.7 12/27/2019     01/01/2020    CO2 21 01/01/2020    BUN 18 01/01/2020    CREATININE 0.9 01/01/2020    GFRAA >60 01/01/2020    LABGLOM >60 01/01/2020    GLUCOSE 99 01/01/2020    PROT 5.5 12/27/2019    LABALBU 3.3 12/27/2019    CALCIUM 8.1 01/01/2020    BILITOT 0.4 12/27/2019    ALKPHOS 98 12/27/2019    AST 17 12/27/2019    ALT 26 12/27/2019         Current Facility-Administered Medications:     metoprolol tartrate (LOPRESSOR) tablet 75 mg, 75 mg, Oral, BID, Sadia Anderson DO, 75 mg at 01/02/20 5557   mometasone-formoterol (DULERA) 200-5 MCG/ACT inhaler 2 puff, 2 puff, Inhalation, BID **AND** MDI Treatment, , , BID, Tran Walker MD    sodium chloride flush 0.9 % injection 10 mL, 10 mL, Intravenous, 2 times per day, Rajinder Nelson MD, 10 mL at 01/02/20 2136    sodium chloride flush 0.9 % injection 10 mL, 10 mL, Intravenous, PRN, Rajinder Nelson MD    magnesium hydroxide (MILK OF MAGNESIA) 400 MG/5ML suspension 30 mL, 30 mL, Oral, Daily PRN, Rajinder Nelson MD    ondansetron [facility]) injection 4 mg, 4 mg, Intravenous, Q6H PRN, Rajinder Nelson MD    pantoprazole (PROTONIX) injection 40 mg, 40 mg, Intravenous, Daily, 40 mg at 01/02/20 1144 **AND** sodium chloride (PF) 0.9 % injection 10 mL, 10 mL, Intravenous, Daily, Rajinder Nelson MD, 10 mL at 01/02/20 1144    ipratropium-albuterol (DUONEB) nebulizer solution 1 ampule, 1 ampule, Inhalation, 4x daily, Rajinder Nelson MD, 1 ampule at 01/02/20 1926    acetaminophen (TYLENOL) tablet 650 mg, 650 mg, Oral, Q4H PRN, Rajinder Nelson MD, 650 mg at 12/31/19 2214    polyethylene glycol (GLYCOLAX) packet 17 g, 17 g, Oral, Daily PRN, Rajinder Nelson MD    glucose (GLUTOSE) 40 % oral gel 15 g, 15 g, Oral, PRN, Rajinder Nelson MD    dextrose 50 % IV solution, 12.5 g, Intravenous, PRN, Rajinder Nelson MD    glucagon (rDNA) injection 1 mg, 1 mg, Intramuscular, PRN, Rajinder Nelson MD    dextrose 5 % solution, 100 mL/hr, Intravenous, PRN, Rajinder Nelson MD    Apremilast TABS 30 mg, 30 mg, Oral, Daily, Rajinder Nelson MD, 30 mg at 01/02/20 1143    docusate sodium (COLACE) capsule 100 mg, 100 mg, Oral, Daily, Rajinder Nelson MD, 100 mg at 01/02/20 7527    Assessment:    Active Problems:    Bilateral pulmonary embolism ([address]    Palliative care by specialist  Resolved Problems:    * No resolved hospital problems.  *    72 year old male, patient of Dr. Kwabena Morrison with stage IV Malignant melanoma, originally diagnosed as a stage IIIC (T3b N1a)

## 2020-01-03 NOTE — CARE COORDINATION
SOCIAL WORK/DISCHARGE PLANNING;  Pt for discharge today and will transfer to [facility] at an snf level of care. Pt is agreeable and will contact his wife. Spoke with Shonna Rolon and notified her of transfer at 4:00pm via [facility] ambulance. Paperwork in chart for transfer.   Lucero VIGIL

## 2020-01-03 NOTE — PROGRESS NOTES
01/01/2020    K 3.7 12/27/2019     01/01/2020    CO2 21 01/01/2020    BUN 18 01/01/2020    LABALBU 3.3 12/27/2019    CREATININE 0.9 01/01/2020    CALCIUM 8.1 01/01/2020    GFRAA >60 01/01/2020    LABGLOM >60 01/01/2020     PT/INR:    Lab Results   Component Value Date    PROTIME 13.4 12/31/2019    INR 1.2 12/31/2019     Last 3 Troponin:    Lab Results   Component Value Date    TROPONINI <0.01 12/28/2019    TROPONINI <0.01 12/27/2019    TROPONINI <0.01 12/27/2019     TSH:    Lab Results   Component Value Date    TSH 3.680 03/03/2014      Assessment:     1. Acute bilateral pulmonary embolism s/p IVC filter  2. PAF  3. CAD  4. Generalized muscle weakness  5.  Metastatic malignant melanoma of the skin to the brain and lymph nodes    Plan:       Continue same plan and orders    DC planning in progress-to [facility] in SNF setting

## 2020-01-03 NOTE — PROGRESS NOTES
Nutrition Assessment    Type and Reason for Visit: Initial(LOS)    Nutrition Recommendations: Recommend and start Ensure High Protein supplement TID to help meet increased nutritional needs. Nutrition Assessment: Patients po intake has been sporadic since admission, averaging 50-75% of most meals served since admission ; Pt at nutritional risk d/t increased needs from surgical wound healing ; Pt at further nutritional compromise AEB increased needs from catabolic illness (malignant melanoma with mets) ; Will start nutritional supplementation     Malnutrition Assessment:  · Malnutrition Status: At risk for malnutrition  · Context: Acute illness or injury  · Findings of the 6 clinical characteristics of malnutrition (Minimum of 2 out of 6 clinical characteristics is required to make the diagnosis of moderate or severe Protein Calorie Malnutrition based on AND/ASPEN Guidelines):  1. Energy Intake-Less than or equal to 75% of estimated energy requirement, Greater than or equal to 7 days    2. Weight Loss-Unable to assess, unable to assess  3. Fat Loss-No significant subcutaneous fat loss,    4. Muscle Loss-No significant muscle mass loss,    5. Fluid Accumulation-Moderate to severe fluid accumulation, Extremities  6.   Strength-Not measured    Nutrition Risk Level: Low    Nutrient Needs:  · Estimated Daily Total Kcal: 6019-7478 (REE 2167 x 1.2 SF)  · Estimated Daily Protein (g): 150-160 (2.0g/kg IBW)  · Estimated Daily Total Fluid (ml/day): 0966-5996    Nutrition Diagnosis:   · Problem: Increased nutrient needs  · Etiology: related to Increased demand for energy/nutrients     Signs and symptoms:  as evidenced by Presence of wounds    Objective Information:  · Nutrition-Focused Physical Findings: -I&Os (-3.6 L), 3+ pitting edema, active/distant BS, rounded abd, psoriasis, A&O x 4, missing teeth, ashen/dry skin, obesity, fair appetite, redness to heels      · Wound Type: Surgical Wound(Incision to groin noted)

## 2020-01-04 NOTE — PROGRESS NOTES
Dr. Kahlil Singh called @ 6182. Dr. Ally Unger spoke to him @ this time. Per Dr. Kahlil Singh we must contact Dr. Katerina Ventura. Called AS @ 2172, advised must speak to Dr. Katerina Ventura, per Dr. Kahlil Singh.

## 2020-01-04 NOTE — PROGRESS NOTES
Called AS @ [phone] for notification of death and get agreement to sign death certificate. Dr. Santiago Neither on call.

## 2020-01-04 NOTE — ED NOTES
Unable to get BP reading from monitor or manual.  Doppler used for manual BP.  70/palp     Manpreet Mcarthur RN  01/04/20 1200

## 2020-01-04 NOTE — ED NOTES
Family and patient requesting to honor patient's wishes for DNR Comfort Care only at this time.      Suzanna Salcido RN  01/04/20 9471

## 2020-01-04 NOTE — ED PROVIDER NOTES
HPI  This is a 76 yo M with a PMHx significant for malignant melanoma, colon cancer, multiple b/l PEs (s/p vena cava filter), A Fib, HLD and CAD (s/p stent placement) who presents by EMS from his rehab facility in respiratory distress. On arrival the pt is unable to speak in more than 3-4 word sentences and has difficulty telling us what issues he is having. He does state he has cancer and that \"there's something in my head. \" He repeatedly states that he is tired and says \"I just want to go to sleep. \"  Any further history was provided by EMS and the pt's wife. The patient is currently taking no blood thinners. The history is provided by the patient, his wife and EMS. Review of Systems   Unable to perform ROS: Severe respiratory distress        Physical Exam  Vitals signs reviewed. Constitutional:       General: He is awake. He is in acute distress. Appearance: He is well-developed. He is obese. He is ill-appearing and diaphoretic. HENT:      Head: Normocephalic and atraumatic. Eyes:      General: No scleral icterus. Extraocular Movements: Extraocular movements intact. Conjunctiva/sclera: Conjunctivae normal.   Neck:      Musculoskeletal: Normal range of motion and neck supple. Cardiovascular:      Rate and Rhythm: Tachycardia present. Rhythm irregular. Heart sounds: No murmur. No friction rub. No gallop. Comments: Heart sounds distant, but present  HR in 190s - 200s  Pulses weak, fast and only intermittently palpable  Pulmonary:      Effort: Tachypnea, accessory muscle usage and respiratory distress (severe) present. Breath sounds: Decreased air movement present. Decreased breath sounds and rales (intermittent at b/l lung bases) present. No wheezing. Comments: Pt arrived on 6L O2 nonrebreather; only able to speak in 3-4 word sentences with substantial breaks in between each sentence  Abdominal:      General: Bowel sounds are normal. There is distension. hospital encounter of 01/04/20   Troponin   Result Value Ref Range    Troponin 0.05 (H) 0.00 - 0.03 ng/mL   CBC auto differential   Result Value Ref Range    WBC 16.0 (H) 4.5 - 11.5 E9/L    RBC 4.17 3.80 - 5.80 E12/L    Hemoglobin 12.8 12.5 - 16.5 g/dL    Hematocrit 42.6 37.0 - 54.0 %    .2 (H) 80.0 - 99.9 fL    MCH 30.7 26.0 - 35.0 pg    MCHC 30.0 (L) 32.0 - 34.5 %    RDW 16.6 (H) 11.5 - 15.0 fL    Platelets 473 701 - 558 E9/L    MPV 10.7 7.0 - 12.0 fL    Neutrophils % 80.0 43.0 - 80.0 %    Lymphocytes % 11.3 (L) 20.0 - 42.0 %    Monocytes % 2.6 2.0 - 12.0 %    Eosinophils % 0.0 0.0 - 6.0 %    Basophils % 0.0 0.0 - 2.0 %    Neutrophils Absolute 13.60 (H) 1.80 - 7.30 E9/L    Lymphocytes Absolute 1.76 1.50 - 4.00 E9/L    Monocytes Absolute 0.48 0.10 - 0.95 E9/L    Eosinophils Absolute 0.00 (L) 0.05 - 0.50 E9/L    Basophils Absolute 0.00 0.00 - 0.20 E9/L    Metamyelocytes Relative 3.5 (H) 0.0 - 1.0 %    Myelocyte Percent 1.7 0 - 0 %    Promyelocytes Percent 0.9 0 - 0 %    nRBC 0.0 /100 WBC    Anisocytosis 1+     Polychromasia 1+     Poikilocytes 2+     [place] Cells 2+    Urinalysis   Result Value Ref Range    Color, UA Yellow Straw/Yellow    Clarity, UA Clear Clear    Glucose, Ur Negative Negative mg/dL    Bilirubin Urine SMALL (A) Negative    Ketones, Urine Negative Negative mg/dL    Specific Gravity, UA 1.020 1.005 - 1.030    Blood, Urine TRACE-INTACT Negative    pH, UA 5.5 5.0 - 9.0    Protein, UA Negative Negative mg/dL    Urobilinogen, Urine 2.0 (A) <2.0 E.U./dL    Nitrite, Urine Negative Negative    Leukocyte Esterase, Urine TRACE (A) Negative   Lactate, Sepsis   Result Value Ref Range    Lactic Acid, Sepsis 10.7 (HH) 0.5 - 1.9 mmol/L   APTT   Result Value Ref Range    aPTT 32.3 24.5 - 35.1 sec   Protime-INR   Result Value Ref Range    Protime 16.8 (H) 9.3 - 12.4 sec    INR 1.5    Amylase   Result Value Ref Range    Amylase 105 (H) 20 - 100 U/L   Lipase   Result Value Ref Range    Lipase 49 13 - 60 U/L Comprehensive Metabolic Panel w/ Reflex to MG   Result Value Ref Range    Sodium 135 132 - 146 mmol/L    Potassium reflex Magnesium 6.1 (H) 3.5 - 5.0 mmol/L    Chloride 92 (L) 98 - 107 mmol/L    CO2 12 (L) 22 - 29 mmol/L    Anion Gap 31 (H) 7 - 16 mmol/L    Glucose 209 (H) 74 - 99 mg/dL    BUN 44 (H) 8 - 23 mg/dL    CREATININE 3.3 (H) 0.7 - 1.2 mg/dL    GFR Non-African American 19 >=60 mL/min/1.73    GFR African American 23     Calcium 8.7 8.6 - 10.2 mg/dL    Total Protein 7.1 6.4 - 8.3 g/dL    Alb 3.7 3.5 - 5.2 g/dL    Total Bilirubin 2.8 (H) 0.0 - 1.2 mg/dL    Alkaline Phosphatase 126 40 - 129 U/L     (H) 0 - 40 U/L     (H) 0 - 39 U/L   Arterial Blood Gas, Respiratory Only   Result Value Ref Range    Source: Arterial     pH, Blood Gas 7.150 (LL) 7.350 - 7.450    pCO2, Arterial 22.5 (L) 35.0 - 45.0 mmHg    pO2, Arterial 212.8 (H) 60.0 - 80.0 mmHg    HCO3, Arterial 7.8 (L) 22.0 - 26.0 mmol/L    B.E. -19.2 (L) -3.0 - 0.0 mmol/L    O2 Sat 99.5 (H) 92.0 - 98.5 %          DEVICE 15,932,521,400,820     Critical Notification Yes    Microscopic Urinalysis   Result Value Ref Range    WBC, UA 0-1 0 - 5 /HPF    RBC, UA 0-1 0 - 2 /HPF    Epi Cells RARE /HPF    Bacteria, UA NONE /HPF   EKG 12 Lead   Result Value Ref Range    Ventricular Rate 113 BPM    Atrial Rate 115 BPM    QRS Duration 72 ms    Q-T Interval 338 ms    QTc Calculation (Bazett) 463 ms    R Axis -28 degrees    T Axis 59 degrees   EKG 12 lead   Result Value Ref Range    Ventricular Rate 182 BPM    Atrial Rate 214 BPM    QRS Duration 72 ms    Q-T Interval 242 ms    QTc Calculation (Bazett) 421 ms    R Axis -45 degrees    T Axis 80 degrees       EKG:  Rate: Ventricular: 182   Atrial: 214  Rhythm: Atrial fibrillation w/RVR and PVCs  Axis: left  ST Segments: nonspecific changes  T-Waves: non specific changes  Interpretation: Abnormal EKG  Comparison: significant changes compared to previous EKG on 12/27/19    Radiology  XR CHEST 1 VW about initiating further treatment, the pt states \"I'm done. I'm done. I don't want to do this anymore. I can't do this anymore. \" Pt's wife states she wants to honor his wishes and that they both understand that he will likely die without further intervention. They then confirmed a second time that they would like no further emergent care, but would prefer we \"do whatever we can to him comfortable. \"    BiPAP removed and the pt was placed on 6L O2 NC. [ML]   26 Spoke with Dr. Romayne Loll who is covering for Dr. Roy President and explained the patient's condition and he and his wife's preference to continue only with comfort care. At this time it seems likely that the patient will die prior to admission. We are initiating comfort measures at this time including 4 mg of morphine and 1 mg of Ativan. [ML]   1039 Patient was reevaluated and was determined to have  with his wife and sister-in-law at bedside at 12:22 PM.  The morphine and Ativan that were ordered were not given. [ML]      ED Course User Index  [ML] Hebert , DO       4882: Patient pronounced dead at 12:22 PM shortly after he and his wife requested no further intervention beyond comfort care. Death was confirmed by loss of monitored cardiac activity, loss of respirations/respiratory drive and loss of responsiveness to stimulation. The patient was cared for in the emergency department by myself and the assigned attending physician, Dr. Vandana Vitale, who agreed with the care provided as laid out herein. Following the confirmation of death, all family questions were answered and they were provided unlimited time with the . Pastoral care was contacted on behalf of the patient and his family. Communication with the nursing home and the patient's PCP, Dr. Roy President, were initiated by staff and all family requests were honored to the extent possible.       This patient's ED course included: a personal history and physicial examination, multiple bedside re-evaluations, IV medications, cardiac monitoring, continuous pulse oximetry, complex medical decision making and emergency management and pronouncement of death. Clinical Impression  1. Acute on chronic respiratory failure with hypoxia (HCC)    2. Chronic atrial fibrillation    3. Malignant melanoma, unspecified site [facility])       Disposition  Patient's condition and disposition:          Sunday ChristiansonDO  Resident  [phone]      ATTENDING PROVIDER ATTESTATION:     Paris Licona presented to the emergency department for evaluation of Respiratory Distress   and was initially evaluated by the Medical Resident. See Original ED Note for H&P and ED course above. I have reviewed and discussed the case, including pertinent history (medical, surgical, family and social) and exam findings with the Medical Resident assigned to Paris Licona. I have personally performed and/or participated in the history, exam, medical decision making, and procedures and agree with all pertinent clinical information. Patient seen with resident upon arrival.  Patient presents in A. fib RVR rate of greater than 180 and acute respiratory distress. Patient is history of malignant melanoma to the brain. He was recently diagnosed with pulmonary embolism unable to be anticoagulated because of the brain tumor. Patient was immediately placed on BiPAP. He was given Cardizem to slow his heart rate down. Patient continued to deteriorate. He was found to be metabolic acidosis on ABG with respiratory compromise. Patient's wife stated she wanted to make him DNR CC and only give him comfort care. All intervention was stopped. He was placed on oxygen. He was given morphine and Ativan for pain control and agitation. Patient eventually stopped breathing and  from acute respiratory failure. Patient was pronounced  at 1. Patient's a wife and family were continuously updated.   Patient is a PCP was

## 2020-01-06 LAB — URINE CULTURE, ROUTINE: NORMAL

## 2020-01-09 LAB
BLOOD CULTURE, ROUTINE: NORMAL
CULTURE, BLOOD 2: NORMAL

## 2020-01-30 NOTE — DISCHARGE SUMMARY
local  nursing facility. PHYSICIANS FOLLOWING THE PATIENT DURING THIS HOSPITAL STAY:  Pulmonary. DISCHARGE CONDITION:  Level of function is fair. UNREPORTED TEST RESULTS AND INTENDED FOLLOWUP:  Nonapplicable. LEVEL OF ACTIVITY:  To be determined by physical therapy at the nursing  home. DISCHARGE DIET:  Includes low-fat, low-cholesterol, 2-gm sodium diet. DISCHARGE MEDICATIONS:  See discharge med reconciliation form. FOLLOWUP:  Will be in the office upon his discharge from the nursing  home.         Jhony Solorzano DO    D: 01/29/2020 20:19:23       T: 01/29/2020 20:26:27     [email or web address]  Job#: [id number]     Doc#: [id number]    CC:

## (undated) DEVICE — CLOTH SURG PREP PREOPERATIVE CHLORHEXIDINE GLUC 2% READYPREP

## (undated) DEVICE — BLADE CLIPPER GEN PURP NS

## (undated) DEVICE — DRAPE,REIN 53X77,STERILE: Brand: MEDLINE

## (undated) DEVICE — 4-PORT MANIFOLD: Brand: NEPTUNE 2

## (undated) DEVICE — GOWN,SIRUS,FABRNF,XL,20/CS: Brand: MEDLINE

## (undated) DEVICE — NEEDLE HYPO 25GA L1.5IN BLU POLYPR HUB S STL REG BVL STR

## (undated) DEVICE — SYRINGE MED 10ML LUERLOCK TIP W/O SFTY DISP

## (undated) DEVICE — SPONGE,LAP,4"X18",XR,ST,5/PK,40PK/CS: Brand: MEDLINE INDUSTRIES, INC.

## (undated) DEVICE — PACK,LAPAROTOMY,NO GOWNS: Brand: MEDLINE

## (undated) DEVICE — COUNTER NDL 30 COUNT DBL MAG

## (undated) DEVICE — SOLUTION IV 100ML 0.9% SOD CHL PLAS CONT USP VIAFLX 1 PER

## (undated) DEVICE — SOLUTION IV IRRIG WATER 1000ML POUR BRL 2F7114

## (undated) DEVICE — TOWEL,OR,DSP,ST,BLUE,STD,6/PK,12PK/CS: Brand: MEDLINE

## (undated) DEVICE — DRAPE,CHEST,FENES,15X10,STERIL: Brand: MEDLINE

## (undated) DEVICE — GAUZE,SPONGE,4"X4",16PLY,XRAY,STRL,LF: Brand: MEDLINE

## (undated) DEVICE — Z DISCONTINUED APPLICATOR SURG PREP 0.35OZ 2% CHG 70% ISO ALC W/ HI LT

## (undated) DEVICE — STANDARD HYPODERMIC NEEDLE,ALUMINUM HUB: Brand: MONOJECT

## (undated) DEVICE — 18GA (1.3MM OD: 1.0MM ID) X 7CM INTRODUCER18GA X 7CM NEEDLENEEDLE: Brand: INTRODUCER NEEDLEINTRODUCER NEEDLE

## (undated) DEVICE — INTENDED FOR TISSUE SEPARATION, AND OTHER PROCEDURES THAT REQUIRE A SHARP SURGICAL BLADE TO PUNCTURE OR CUT.: Brand: BARD-PARKER ® STAINLESS STEEL BLADES

## (undated) DEVICE — DRAPE C ARM UNIV W41XL74IN CLR PLAS XR VELC CLSR POLY STRP

## (undated) DEVICE — BENTSON WIRE GUIDE 20CM DISTAL FLEXIBILITY WITH SOFTENED TIP: Brand: BENTSON

## (undated) DEVICE — MARKER,SKIN,WI/RULER AND LABELS: Brand: MEDLINE

## (undated) DEVICE — LABEL MED 4 IN SURG PANEL W/ PEN STRL

## (undated) DEVICE — GLOVE SURG L12IN SZ 65FNGR THK94MIL TRNSLUC YEL LTX

## (undated) DEVICE — SET SURG INSTR MINI VASC

## (undated) DEVICE — SNAP KOVER: Brand: UNBRANDED

## (undated) DEVICE — PACK PROCEDURE SURG GEN CUST

## (undated) DEVICE — GOWN,SIRUS,FABRNF,L,20/CS: Brand: MEDLINE

## (undated) DEVICE — DOUBLE BASIN SET: Brand: MEDLINE INDUSTRIES, INC.

## (undated) DEVICE — COVER,TABLE,60X90,STERILE: Brand: MEDLINE

## (undated) DEVICE — DRAPE C ARM W41XL74IN UNIV MOB W RUBBERBAND CLP

## (undated) DEVICE — CLAMP TONSIL

## (undated) DEVICE — SKIN AFFIX SURG ADHESIVE 72/CS 0.55ML: Brand: MEDLINE

## (undated) DEVICE — ELECTRODE PT RET AD L9FT HI MOIST COND ADH HYDRGEL CORDED

## (undated) DEVICE — GLOVE SURG SZ 75 STD WHT LTX SYN POLYMER BEAD REINF ANTI RL

## (undated) DEVICE — SYRINGE MED 20ML STD CLR PLAS LUERSLIP TIP N CTRL DISP

## (undated) DEVICE — CONTROL SYRINGE LUER-LOCK TIP: Brand: MONOJECT

## (undated) DEVICE — APPLICATOR PREP 26ML 0.7% IOD POVACRYLEX 74% ISO ALC ST

## (undated) DEVICE — SYRINGE MED 10ML POLYPR LUERSLIP TIP FLAT TOP W/O SFTY DISP